# Patient Record
Sex: FEMALE | Race: WHITE
[De-identification: names, ages, dates, MRNs, and addresses within clinical notes are randomized per-mention and may not be internally consistent; named-entity substitution may affect disease eponyms.]

---

## 2017-07-09 ENCOUNTER — HOSPITAL ENCOUNTER (INPATIENT)
Dept: HOSPITAL 1 - ED | Age: 22
LOS: 4 days | Discharge: HOME | DRG: 720 | End: 2017-07-13
Attending: FAMILY MEDICINE | Admitting: FAMILY MEDICINE
Payer: COMMERCIAL

## 2017-07-09 VITALS — DIASTOLIC BLOOD PRESSURE: 42 MMHG | SYSTOLIC BLOOD PRESSURE: 128 MMHG

## 2017-07-09 VITALS — SYSTOLIC BLOOD PRESSURE: 100 MMHG | DIASTOLIC BLOOD PRESSURE: 70 MMHG

## 2017-07-09 VITALS
HEIGHT: 64 IN | WEIGHT: 293 LBS | HEIGHT: 64 IN | BODY MASS INDEX: 50.02 KG/M2 | BODY MASS INDEX: 50.02 KG/M2 | WEIGHT: 293 LBS

## 2017-07-09 DIAGNOSIS — D50.9: ICD-10-CM

## 2017-07-09 DIAGNOSIS — K72.01: ICD-10-CM

## 2017-07-09 DIAGNOSIS — N17.0: ICD-10-CM

## 2017-07-09 DIAGNOSIS — F72: ICD-10-CM

## 2017-07-09 DIAGNOSIS — K72.90: ICD-10-CM

## 2017-07-09 DIAGNOSIS — N39.0: ICD-10-CM

## 2017-07-09 DIAGNOSIS — F84.0: ICD-10-CM

## 2017-07-09 DIAGNOSIS — E66.01: ICD-10-CM
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
## 2017-07-09 DIAGNOSIS — E43: ICD-10-CM

## 2017-07-09 DIAGNOSIS — A41.9: Primary | ICD-10-CM

## 2017-07-09 DIAGNOSIS — Z66: ICD-10-CM

## 2017-07-09 DIAGNOSIS — I89.0: ICD-10-CM

## 2017-07-09 DIAGNOSIS — L03.116: ICD-10-CM

## 2017-07-09 DIAGNOSIS — K90.0: ICD-10-CM

## 2017-07-09 DIAGNOSIS — K29.70: ICD-10-CM

## 2017-07-09 DIAGNOSIS — T14.8: ICD-10-CM

## 2017-07-09 DIAGNOSIS — R65.20: ICD-10-CM

## 2017-07-09 DIAGNOSIS — B96.20: ICD-10-CM

## 2017-07-09 LAB
ALBUMIN SERPL-MCNC: 3.2 G/DL (ref 3.4–5)
ALP SERPL-CCNC: 94 U/L (ref 46–116)
ALT SERPL-CCNC: 15 U/L (ref 14–59)
AST SERPL-CCNC: 14 U/L (ref 15–37)
BASOPHILS NFR BLD: 1.9 % (ref 0–2)
BILIRUB SERPL-MCNC: 1.2 MG/DL (ref 0.2–1)
BUN SERPL-MCNC: 12 MG/DL (ref 7–18)
CALCIUM SERPL-MCNC: 8.3 MG/DL (ref 8.5–10.1)
CHLORIDE SERPL-SCNC: 104 MMOL/L (ref 98–107)
CK MB SERPL-MCNC: 0.6 NG/ML (ref 0–3.6)
CK SERPL-CCNC: 84 U/L (ref 26–192)
CO2 SERPL-SCNC: 26.2 MMOL/L (ref 21–32)
CREAT SERPL-MCNC: 0.8 MG/DL (ref 0.6–1)
ERYTHROCYTE [DISTWIDTH] IN BLOOD BY AUTOMATED COUNT: 19 % (ref 11.5–14.5)
GFR SERPLBLD BASED ON 1.73 SQ M-ARVRAT: > 60 ML/MIN
GLUCOSE SERPL-MCNC: 93 MG/DL (ref 74–106)
IRON SERPL-MCNC: 11 UG/DL (ref 50–170)
MICROSCOPIC UR-IMP: YES
PLATELET # BLD: 197 X10^3MCL (ref 130–400)
POTASSIUM SERPL-SCNC: 4 MMOL/L (ref 3.5–5.1)
PROT SERPL-MCNC: 7 G/DL (ref 6.4–8.2)
RBC # BLD AUTO: 2.99 M/MM3 (ref 4.1–5.1)
RBC # UR STRIP.AUTO: (no result) /UL
RBC MORPH BLD: (no result)
SODIUM SERPL-SCNC: 138 MMOL/L (ref 136–145)
TIBC SERPL-MCNC: 257 UG/DL (ref 250–450)
UA SPECIFIC GRAVITY: 1.01 (ref 1–1.03)

## 2017-07-09 PROCEDURE — A4301 IMPLANTABLE ACCESS SYST PERC: HCPCS

## 2017-07-09 PROCEDURE — P9016 RBC LEUKOCYTES REDUCED: HCPCS

## 2017-07-09 NOTE — NUR
PT RESTING IN BED IN A POSITION OF COMFORT WITH MOTHER AT BEDSIDE, RESP EVEN
AND UNLABORED, PT IN NO ACUTE DISTRESS, MOTHER AT BEDSIDE

## 2017-07-09 NOTE — NUR
PT BROUGHT IN VIA [facility] TO BE EVALUATED FOR THE COMPLAINT OF FEVER
WHICH BEGAN THIS AM AND INCREASED AGITATION PER THE PTS MOTHER.
 
MSE WAS COMPLETED BY DR. EDGAR.
 
PT HAS HX OF SEVERE AUTISM AND IS NON-VERBAL. PT HAS ELEPHANITIS NOTED TO THE
BILATERAL LOWER EXTREMITITES.

## 2017-07-09 NOTE — NUR
RECEIVED PATIENT FROM ED VIA GUERNEY, PATIENT A/O TO NAME MOTHER AT BEDSIDE,
TELE # 3 ST, IV ACCESS TO DIXON WNL, PATIENT NON VERBAL MOTHER AT BEDSIDE STATES
PATIENT IS IN PAIN AND WILL MEDICATE AS ORDERED, ORIENTED PATIENT TO ROOM AND
SURROUNDINGS, BED IN LOW POSITION BED RAILS UP X 2, CALL LIGHT WITHIN REACH,
WILL ENDORSE CARE TO PRIMARY NURSE CATALINA LICONA

## 2017-07-10 VITALS — DIASTOLIC BLOOD PRESSURE: 32 MMHG | SYSTOLIC BLOOD PRESSURE: 122 MMHG

## 2017-07-10 VITALS — SYSTOLIC BLOOD PRESSURE: 131 MMHG | DIASTOLIC BLOOD PRESSURE: 50 MMHG

## 2017-07-10 VITALS — DIASTOLIC BLOOD PRESSURE: 51 MMHG | SYSTOLIC BLOOD PRESSURE: 109 MMHG

## 2017-07-10 VITALS — DIASTOLIC BLOOD PRESSURE: 92 MMHG | SYSTOLIC BLOOD PRESSURE: 135 MMHG

## 2017-07-10 VITALS — SYSTOLIC BLOOD PRESSURE: 123 MMHG | DIASTOLIC BLOOD PRESSURE: 59 MMHG

## 2017-07-10 LAB
AMYLASE SERPL-CCNC: 15 U/L (ref 25–115)
BASOPHILS NFR BLD: 0 % (ref 0–2)
BUN SERPL-MCNC: 13 MG/DL (ref 7–18)
CALCIUM SERPL-MCNC: 7.9 MG/DL (ref 8.5–10.1)
CHLORIDE SERPL-SCNC: 107 MMOL/L (ref 98–107)
CHOLEST SERPL-MCNC: 79 MG/DL (ref ?–200)
CHOLEST/HDLC SERPL: 1.6 MG/DL
CO2 SERPL-SCNC: 21.8 MMOL/L (ref 21–32)
CREAT SERPL-MCNC: 0.9 MG/DL (ref 0.6–1)
ERYTHROCYTE [DISTWIDTH] IN BLOOD BY AUTOMATED COUNT: 19.8 % (ref 11.5–14.5)
GFR SERPLBLD BASED ON 1.73 SQ M-ARVRAT: > 60 ML/MIN
GLUCOSE SERPL-MCNC: 93 MG/DL (ref 74–106)
HDLC SERPL-MCNC: 50 MG/DL (ref 40–60)
LIPASE SERPL-CCNC: 51 IU/L (ref 73–393)
MAGNESIUM SERPL-MCNC: 1.8 MG/DL (ref 1.8–2.4)
MONOCYTES NFR BLD: 1 % (ref 0–7)
NEUTS BAND NFR BLD: 1 % (ref 0–10)
NEUTS SEG NFR BLD MANUAL: 95 % (ref 37–75)
PHOSPHATE SERPL-MCNC: 3.6 MG/DL (ref 2.5–4.9)
PLAT MORPH BLD: (no result)
PLATELET # BLD: 162 X10^3MCL (ref 130–400)
POTASSIUM SERPL-SCNC: 4.3 MMOL/L (ref 3.5–5.1)
RBC MORPH BLD: (no result)
SODIUM SERPL-SCNC: 140 MMOL/L (ref 136–145)
T3 SERPL-MCNC: 0.8 NG/ML
T3RU NFR SERPL: 37 % UPTAKE (ref 30–39)
T4 FREE SERPL-MCNC: 1.76 NG/DL (ref 0.76–1.46)
T4 SERPL-MCNC: 9.7 UG/DL (ref 4.7–13.3)
T4/T3 UPTAKE INDEX SERPL: 3.6 UG/DL (ref 1.4–4.5)
TRIGL SERPL-MCNC: 56 MG/DL (ref ?–150)

## 2017-07-10 NOTE — NUR
RECEIVED Pt A/O X1 Pt IS APHASIC UNABLE TO ANSWER ANY QUESTIONS. NO DISTRESS
NOTED AT THIS TIME. LUNG SOUNDS ARE CTA BILATERALLY. Pt IS ON ROOM AIR, NO SOB
NOTED. ACTIVE BOWEL SOUNDS X4 QUADS. Pt WITH EDEMA TO BLE, REDNESS, WARM TO
TOUCH AND TENDERNESS TO EXTREMITIES MAINLY TO LEFT UPPER THIGH. SOME DRAINNAGE
NOTED WITH FOUL SMELL. MOTHER IS AT BEDSIDE. SAFETY AND COMFORT MEASURES
REMAIN IN PLACE. WILL CONTINUE TO MONITOR.

## 2017-07-10 NOTE — NUR
PT AWAKE ALERT, PT IS ASPHASIC. TELE 3 SR. PT IS INCONTINENT OF BOWEL AND
BLADDER. LUNGS DIMINISHED TO BLL. GENERALIZED WEAKNESS. BOWEL TONES ACTIVE IN
ALL 4 QUADS. BLE CELLULITIS. NO SIGNS OF PAIN. IV TO THE DIXON PATENT AND
INTACT. PT IS CALM AND COOEPRATIVE WITH CARE. CALL LIGHT IN REACH.

## 2017-07-10 NOTE — NUR
PT RESTING IN BED MOTHER AT BEDSIDE. NO SIGNS OF DISTRESS. NO SIGNS OF NON
VERBAL INDICATORS OF PAIN. WILL CONTINUE TO MONITOR.

## 2017-07-10 NOTE — NUR
PATIENT WITH ELEVATED TEMPERATURE.  PRN TYLENOL PROVIDED AND TOLERATED WELL.
COOLING MEASURES IN PLACE.  WILL CONTINUE MONITORING.

## 2017-07-10 NOTE — NUR
TEMPERATIVE RECHECKED AND WNL. PT SKIN WARM TO TOUCH. PT NONVERBAL UNABLE TO
ASSESS NEURO STATUS. MOTHER REPORTS PT IS AT PTS BASELINE. AIR CONDITIONER ON
WILL CONTINUE TO MONITOR.

## 2017-07-10 NOTE — NUR
PT RESTING IN BED. NO DISTRESS NOTED. IVF INFUSING WELL. CALL LIGHT WITHIN
REACH. WILL CONTINUE TO MONITOR.

## 2017-07-10 NOTE — NUR
Pt MEDIACTED WITH TORADOL IV FOR TEMP .0
A/C IS ON, Pt REFUSES TO KEEP COLD PACKS. WILL CONTINUE TO MONITOR.

## 2017-07-11 VITALS — SYSTOLIC BLOOD PRESSURE: 128 MMHG | DIASTOLIC BLOOD PRESSURE: 43 MMHG

## 2017-07-11 VITALS — DIASTOLIC BLOOD PRESSURE: 66 MMHG | SYSTOLIC BLOOD PRESSURE: 127 MMHG

## 2017-07-11 VITALS — SYSTOLIC BLOOD PRESSURE: 128 MMHG | DIASTOLIC BLOOD PRESSURE: 67 MMHG

## 2017-07-11 VITALS — DIASTOLIC BLOOD PRESSURE: 47 MMHG | SYSTOLIC BLOOD PRESSURE: 98 MMHG

## 2017-07-11 VITALS — DIASTOLIC BLOOD PRESSURE: 63 MMHG | SYSTOLIC BLOOD PRESSURE: 100 MMHG

## 2017-07-11 VITALS — DIASTOLIC BLOOD PRESSURE: 54 MMHG | SYSTOLIC BLOOD PRESSURE: 112 MMHG

## 2017-07-11 LAB
BASOPHILS NFR BLD: 0 % (ref 0–2)
BASOPHILS NFR BLD: 0.2 % (ref 0–2)
BUN SERPL-MCNC: 19 MG/DL (ref 7–18)
CALCIUM SERPL-MCNC: 7.9 MG/DL (ref 8.5–10.1)
CHLORIDE SERPL-SCNC: 108 MMOL/L (ref 98–107)
CO2 SERPL-SCNC: 24.8 MMOL/L (ref 21–32)
CREAT SERPL-MCNC: 1 MG/DL (ref 0.6–1)
ERYTHROCYTE [DISTWIDTH] IN BLOOD BY AUTOMATED COUNT: 19 % (ref 11.5–14.5)
ERYTHROCYTE [DISTWIDTH] IN BLOOD BY AUTOMATED COUNT: 19.3 % (ref 11.5–14.5)
GFR SERPLBLD BASED ON 1.73 SQ M-ARVRAT: > 60 ML/MIN
GLUCOSE SERPL-MCNC: 92 MG/DL (ref 74–106)
MAGNESIUM SERPL-MCNC: 1.9 MG/DL (ref 1.8–2.4)
PHOSPHATE SERPL-MCNC: 3.9 MG/DL (ref 2.5–4.9)
PLATELET # BLD: 140 X10^3MCL (ref 130–400)
PLATELET # BLD: 167 X10^3MCL (ref 130–400)
POTASSIUM SERPL-SCNC: 4.3 MMOL/L (ref 3.5–5.1)
RBC MORPH BLD: (no result)
RBC MORPH BLD: (no result)
SODIUM SERPL-SCNC: 141 MMOL/L (ref 136–145)
TARGETS BLD QL SMEAR: (no result)
TRANSFERRIN SERPL-MCNC: 227 MG/DL (ref 200–370)

## 2017-07-11 PROCEDURE — 0DB68ZX EXCISION OF STOMACH, VIA NATURAL OR ARTIFICIAL OPENING ENDOSCOPIC, DIAGNOSTIC: ICD-10-PCS | Performed by: INTERNAL MEDICINE

## 2017-07-11 PROCEDURE — 0DB98ZX EXCISION OF DUODENUM, VIA NATURAL OR ARTIFICIAL OPENING ENDOSCOPIC, DIAGNOSTIC: ICD-10-PCS | Performed by: INTERNAL MEDICINE

## 2017-07-11 PROCEDURE — 30233N1 TRANSFUSION OF NONAUTOLOGOUS RED BLOOD CELLS INTO PERIPHERAL VEIN, PERCUTANEOUS APPROACH: ICD-10-PCS | Performed by: FAMILY MEDICINE

## 2017-07-11 NOTE — NUR
PT AWAKE ALERT, SPHASIC. TELE 2. MOTHER STATES PT IS AT NORMAL BASELINE. DUE
TO DEFICITS UNABLE TO ASSESS NEURO STATUS COMPLETLY. PT TRACKS WITH EYES,
FLEXES TO PAIN, AND MOANS AND GRUNTS WHEN IN PAIN OR NEEDING ASSISTANCE. LUNG
SOUNDS DIMINISHED TO BLL, SHALLOW EVEN REPIRATIONS. BOWEL TONES ACTIVE IN ALL
QUADRANTS. PT IS INCONTINENT OF BOWEL AND BLADDER. BED REST AT THIS TIME. SKIN
IS INTACT, WITH CELLULITIS AND DISCOLORATION TO BLE. IV TO THE SHLOMO/RH
INFUSING, PT CURRENTLY RECIEVING BLOOD TRANSFUSION. FAMILY AT BEDSIDE. WILL
CONTINUE TO MONITOR CALL LIGHT IN REACH.

## 2017-07-11 NOTE — NUR
I HAVE REVIEWED THE DATA COLLECTION BY LVN (NAME): JOSELITO TOBAR LVN
ENTERED ON (DATE/TIME): 7P-7A
 
I CONCUR WITH THE DATA AND ANY EXCEPTIONS OR COMMENTS ARE LISTED BELOW:

## 2017-07-11 NOTE — NUR
PT RESTING IN BED NO SIGNS OF DISTRESS, EVEN UNLABORED RESPIRATIONS VSS. CALL
LIGHT IN REACH, FAMILY AT BEDSIDE WILL CONTINUE TO MONITOR.

## 2017-07-11 NOTE — NUR
PATIENT RECEIVED AWAKE AND ALERT. MOTHER AT BEDSIDE.  NO DISTRESS NOTED. NO
SIGNS AND SYMPTOMS OF PAIN NOTED. IV SITE TO SHLOMO, PATENT AND INTACT. IV SITE
TO RIGHT FOREARM, PATENT AND INTACT. BED IN LOWEST POSITION. CALL LIGHT WITHIN
REACH. WILL CONTINUE TO MONITOR.

## 2017-07-11 NOTE — NUR
PT RESTING IN BED NO SIGNS OF DISTRESS CALL LIGHT IN REACH, FAMILY AT BEDSIDE.
WILL CONTINUE TO MONITOR.

## 2017-07-11 NOTE — NUR
BLOOD TRANSFUSION COMPLETED POST VS WNL, NO TRANSFUSION REACTION. 250 ML
INFUSED, REMAINING BLOOD DISPOSED.

## 2017-07-12 VITALS — SYSTOLIC BLOOD PRESSURE: 141 MMHG | DIASTOLIC BLOOD PRESSURE: 75 MMHG

## 2017-07-12 VITALS — DIASTOLIC BLOOD PRESSURE: 75 MMHG | SYSTOLIC BLOOD PRESSURE: 153 MMHG

## 2017-07-12 VITALS — DIASTOLIC BLOOD PRESSURE: 66 MMHG | SYSTOLIC BLOOD PRESSURE: 141 MMHG

## 2017-07-12 VITALS — SYSTOLIC BLOOD PRESSURE: 130 MMHG | DIASTOLIC BLOOD PRESSURE: 73 MMHG

## 2017-07-12 VITALS — SYSTOLIC BLOOD PRESSURE: 154 MMHG | DIASTOLIC BLOOD PRESSURE: 72 MMHG

## 2017-07-12 LAB
ALBUMIN SERPL-MCNC: 2.1 G/DL (ref 3.4–5)
BASOPHILS NFR BLD: 0 % (ref 0–2)
BASOPHILS NFR BLD: 0 % (ref 0–2)
BUN SERPL-MCNC: 19 MG/DL (ref 7–18)
CALCIUM SERPL-MCNC: 7.8 MG/DL (ref 8.5–10.1)
CHLORIDE SERPL-SCNC: 114 MMOL/L (ref 98–107)
CO2 SERPL-SCNC: 24.5 MMOL/L (ref 21–32)
CREAT SERPL-MCNC: 0.9 MG/DL (ref 0.6–1)
ERYTHROCYTE [DISTWIDTH] IN BLOOD BY AUTOMATED COUNT: 19.5 % (ref 11.5–14.5)
ERYTHROCYTE [DISTWIDTH] IN BLOOD BY AUTOMATED COUNT: 20.2 % (ref 11.5–14.5)
GFR SERPLBLD BASED ON 1.73 SQ M-ARVRAT: > 60 ML/MIN
GLUCOSE SERPL-MCNC: 82 MG/DL (ref 74–106)
MAGNESIUM SERPL-MCNC: 1.9 MG/DL (ref 1.8–2.4)
PHOSPHATE SERPL-MCNC: 3.8 MG/DL (ref 2.5–4.9)
PLATELET # BLD: 167 X10^3MCL (ref 130–400)
PLATELET # BLD: 168 X10^3MCL (ref 130–400)
POTASSIUM SERPL-SCNC: 4.5 MMOL/L (ref 3.5–5.1)
RBC MORPH BLD: (no result)
SODIUM SERPL-SCNC: 147 MMOL/L (ref 136–145)
TARGETS BLD QL SMEAR: (no result)

## 2017-07-12 PROCEDURE — 05HM33Z INSERTION OF INFUSION DEVICE INTO RIGHT INTERNAL JUGULAR VEIN, PERCUTANEOUS APPROACH: ICD-10-PCS | Performed by: FAMILY MEDICINE

## 2017-07-12 PROCEDURE — B543ZZA ULTRASONOGRAPHY OF RIGHT JUGULAR VEINS, GUIDANCE: ICD-10-PCS

## 2017-07-12 NOTE — NUR
PATIENT RESTED THROUGHOUT THE NIGHT. NO DISTRESS NOTED. NO SIGNS AND SYMPTOMS
OF PAIN NOTED. SAFETY AND COMFORT MEASURES MAINTAINED. BED IN LOWEST POSITION.
CALL LIGHT WITHIN REACH. WILL CONTINUE TO MONITOR AND ENDORSE TO NEXT SHIFT
NURSE.

## 2017-07-12 NOTE — NUR
3 PORTS OF CENTRAL LINE FLUSHED WELL PER PROTOCOL. THE IV WITH 1/2 NS RUNNING
VIA THE BLUE PORT OF THE CENTRAL LINE.

## 2017-07-12 NOTE — NUR
RECEIVED THE PATIENT BACK FROM RECOVERY ROOM S/P CENTRAL LINE INSERTION TO
[facility].
THE PATIENT WAS SLIGHTLY DROWSY BUT FOLLOW SIMPLE COMMANDS.
VS CHECKED (SEE DOC).
CALL LIGHT WITHIN REACH.
SIDE RAILS UP X3.
BED WAS AT LOWEST POSITION AND ALARM WAS ON.
THE MOTHER WAS AT BEDSIDE.
CONTINUE TO MONITOR.

## 2017-07-12 NOTE — NUR
THE BLOOD TRANSFUSION COMPLETED. VS CHECKED: TEMP 99.3, /72, RR 16, HR
100, PO2 99 % ON ROOM AIR. NO ADVERSE REACTION NOTED.

## 2017-07-12 NOTE — NUR
AT 1515, ONE UNIT OF PRBC STARTED TO BE INFUSED. THE VS CHECKED TEMP 98.8, HR
102, /73, RR 16, AND PO2 100% ON NASAL CANNULA AT 2L/MIN.
INSTRUCTED THE MOTHER TO NOTIFY THE NURSE FOR ANY ADVERSE REACTION.
AT 1530, RECHECKING THE VS (15MIN BLOOD STARTED TO INFUSED): TEMP 99.3
(AXILLARY) AND 99.9 (TEMPORAL),  /73, RR 16 AND PO2 99% ON NASAL
CANNULA AT 2L/MIN.
DR. NAM WAS PAGED TO NOTIFY THE INCREASING TEMP.

## 2017-07-12 NOTE — NUR
Awake and responsive but no words spoken noted. No resp.distress noted. No
cues of pain. Mother at the bedside. All due meds taken and tolerated well.
Will cont.to monitor. Call light within reach.

## 2017-07-12 NOTE — NUR
DR. NAM AND THE TEAM WERE MAKING ROUND TO SEE THE PATIENT. THE CARE PLAN
WAS DISCUSSED WITH THE PATIENT'S MOTHER. DR. NAM WAS MADE AWARE OF THE
PATIENT'S LAB RESULTS THIS MORNING INCLUDING H/H 5.7/19.

## 2017-07-12 NOTE — NUR
DR. NELSON CALLED BACK AND WAS AWARE OF THE TEMP 99.3 (AXILLARY) AND 99.9
(TEMPORAL). DOCTOR VERBALIZED MEDICATING THE PATIENT WITH TYLENOL 650 MG PO.
THE MED WAS GIVEN TO THE PATIENT.
INSTRUCTED THE MOTHER TO CALL THE NURSE FOR ANY ADVERSE REACTION.
CONTINUE TO MONITOR.

## 2017-07-12 NOTE — NUR
RECEIVED THE PATIENT AWAKE BUT NON-VERBAL AT THIS TIME WITH HX OF AUTISM.
NO INDICATION OF PAIN, SHORTNESS OF BREATH OR NAUSEA/VOMITING.
TELE # 2 READS SINUS TACHYCARDIA.
IVF NS VIA H/L TO RFA.
CALL LIGHT WITHIN REACH.
SIDE RAILS UP X3.
THE MOTHER WAS AT BEDSIDE.

## 2017-07-13 VITALS — DIASTOLIC BLOOD PRESSURE: 76 MMHG | SYSTOLIC BLOOD PRESSURE: 145 MMHG

## 2017-07-13 VITALS — DIASTOLIC BLOOD PRESSURE: 73 MMHG | SYSTOLIC BLOOD PRESSURE: 141 MMHG

## 2017-07-13 VITALS — SYSTOLIC BLOOD PRESSURE: 141 MMHG | DIASTOLIC BLOOD PRESSURE: 73 MMHG

## 2017-07-13 NOTE — NUR
Afebrile. No significant change in condition noted. No active bleeding
observed. Mother at the bedside. No cues of pain. Cont.on IV cleocin and oral
bactrim. Contact isolation E.coli, ESBL urine, proper use of PPE and good hand
hygiene observed. Kept comfortable.

## 2017-07-13 NOTE — NUR
DR. NAM AND THE TEAM WERE MAKING ROUND TO SEE THE PATIENT. THE CARE PLAN
WAS UPDATED TO THE MOTHER AT BEDSIDE. THE MOTHER AGREED WITH THE PLAN.

## 2017-07-13 NOTE — NUR
DISCHARGE INSTRUCTION WAS EXPLAINED AND HANDED TO THE PATIENT'S MOTHER, STARR
ANTONIO.
ALL CONCERNS WERE ADDRESSED AND THE MOTHER VERBALIZED UNDERSTANDING.
H/L AT RFA AND CENTRAL LINE AT RIGHT NECK WERE REMOVED WITH THE TIPS INTACT.
THE TELE WAS REMOVED AND RETURNED.
ID BANDS WERE REMOVED.
THE PATIENT WAS TAKEN TO THE LOBBY VIA HER OWN WHEELCHAIR IN STABLE CONDITION.
ALL HER BELONGINGS WERE SENT HOME WITH THE PATIENT UPON DISCHARGE.

## 2017-07-13 NOTE — NUR
DR. ART-RESIDENT WAS CALLED AND NOTIFIED THAT THE PATIENT'S WOUND CULTURE
SHOWS MDRO. DOCTOR WILL CHECK WITH SENIOR.

## 2017-07-13 NOTE — NUR
RESUME CARE: PATIENT AWAKE BUT NON VERBAL. NO INDICATION OF PAIN,
NAUSEA/VOMITING OR SHORTNESS OF BREATH.
CENTRAL LINE TO RIGHT NECK WITH DRESSING IN PLACE.
ANOTHER H/L TO RFA.
TELE # 2 READS SINUS RHYTHMS WITH EPISODES OF SINUS TACHYCARDIA.
CALL LIGHT WITHIN REACH.
SIDE RAILS UP X3.
BED WAS AT LOWEST POSITION AND ALARM WAS ON.
THE MOTHER WAS AT BEDSIDE.

## 2017-08-23 ENCOUNTER — HOSPITAL ENCOUNTER (INPATIENT)
Dept: HOSPITAL 1 - ED | Age: 22
LOS: 1 days | Discharge: HOME | DRG: 383 | End: 2017-08-24
Attending: FAMILY MEDICINE | Admitting: FAMILY MEDICINE
Payer: COMMERCIAL

## 2017-08-23 VITALS — SYSTOLIC BLOOD PRESSURE: 145 MMHG | DIASTOLIC BLOOD PRESSURE: 97 MMHG

## 2017-08-23 VITALS — WEIGHT: 293 LBS | BODY MASS INDEX: 50.02 KG/M2 | HEIGHT: 64 IN

## 2017-08-23 VITALS — DIASTOLIC BLOOD PRESSURE: 92 MMHG | SYSTOLIC BLOOD PRESSURE: 153 MMHG

## 2017-08-23 DIAGNOSIS — L97.929: ICD-10-CM

## 2017-08-23 DIAGNOSIS — F84.0: ICD-10-CM

## 2017-08-23 DIAGNOSIS — Z94.89: ICD-10-CM

## 2017-08-23 DIAGNOSIS — B87.1: ICD-10-CM

## 2017-08-23 DIAGNOSIS — E66.01: ICD-10-CM

## 2017-08-23 DIAGNOSIS — N17.0: ICD-10-CM

## 2017-08-23 DIAGNOSIS — Z66: ICD-10-CM

## 2017-08-23 DIAGNOSIS — I89.0: ICD-10-CM

## 2017-08-23 DIAGNOSIS — E44.0: ICD-10-CM

## 2017-08-23 DIAGNOSIS — D50.9: ICD-10-CM

## 2017-08-23 DIAGNOSIS — E03.9: ICD-10-CM

## 2017-08-23 DIAGNOSIS — L03.116: Primary | ICD-10-CM

## 2017-08-23 LAB
ALBUMIN SERPL-MCNC: 3.1 G/DL (ref 3.4–5)
ALP SERPL-CCNC: 103 U/L (ref 46–116)
ALT SERPL-CCNC: 35 U/L (ref 14–59)
AMYLASE SERPL-CCNC: 21 U/L (ref 25–115)
AST SERPL-CCNC: 21 U/L (ref 15–37)
BASOPHILS NFR BLD: 0.3 % (ref 0–2)
BILIRUB SERPL-MCNC: 0.33 MG/DL (ref 0.2–1)
BUN SERPL-MCNC: 18 MG/DL (ref 7–18)
CALCIUM SERPL-MCNC: 8.8 MG/DL (ref 8.5–10.1)
CHLORIDE SERPL-SCNC: 107 MMOL/L (ref 98–107)
CHOLEST SERPL-MCNC: 155 MG/DL (ref ?–200)
CHOLEST/HDLC SERPL: 3.8 MG/DL
CK MB SERPL-MCNC: 0.6 NG/ML (ref 0–3.6)
CK SERPL-CCNC: 36 U/L (ref 26–192)
CO2 SERPL-SCNC: 27 MMOL/L (ref 21–32)
CREAT SERPL-MCNC: 0.6 MG/DL (ref 0.6–1)
CRP SERPL-MCNC: 3.7 MG/DL (ref ?–0.9)
ERYTHROCYTE [DISTWIDTH] IN BLOOD BY AUTOMATED COUNT: 22.4 % (ref 11.5–14.5)
GFR SERPLBLD BASED ON 1.73 SQ M-ARVRAT: > 60 ML/MIN
GLUCOSE SERPL-MCNC: 91 MG/DL (ref 74–106)
HDLC SERPL-MCNC: 41 MG/DL (ref 40–60)
LIPASE SERPL-CCNC: 92 IU/L (ref 73–393)
MAGNESIUM SERPL-MCNC: 1.9 MG/DL (ref 1.8–2.4)
PHOSPHATE SERPL-MCNC: 3.9 MG/DL (ref 2.5–4.9)
PLATELET # BLD: 308 X10^3MCL (ref 130–400)
POTASSIUM SERPL-SCNC: 4.3 MMOL/L (ref 3.5–5.1)
PROT SERPL-MCNC: 7.9 G/DL (ref 6.4–8.2)
SODIUM SERPL-SCNC: 142 MMOL/L (ref 136–145)
T3 SERPL-MCNC: 1.69 NG/ML
T3RU NFR SERPL: 36 % UPTAKE (ref 30–39)
T4 FREE SERPL-MCNC: 1.62 NG/DL (ref 0.76–1.46)
T4 SERPL-MCNC: 12.4 UG/DL (ref 4.7–13.3)
T4/T3 UPTAKE INDEX SERPL: 4.5 UG/DL (ref 1.4–4.5)
TRIGL SERPL-MCNC: 109 MG/DL (ref ?–150)

## 2017-08-23 NOTE — NUR
PT WAS RESTLESS AND AGITATED, MEDICATED WITH NORCO AND MORPHINE X1. PT WAS
ABLE TO SLEEP AND NO DISTRESS NOTED AFTERWARDS. SAFETY MEASURES MAINTAINED.
WILL ENDORSE CONTINUITY OF CARE TO ONCOMING RN. MOTHER AT BEDSIDE AND
SUPPORTIVE OF CARE.

## 2017-08-23 NOTE — NUR
DR. GONZALEZ CAME TO SAW PATIENT. WOUND CARE TO L FOOT BY DR. GONZALEZ. PHOTO
TAKEN. WOUND CULTURE CELLECTED.

## 2017-08-23 NOTE — NUR
RECEIVED PATIENT FROM ED VIA GUERNEY, PATIENT A/O X 1 HX OF AUTISM MOTHER AT
BEDSIDE, TELE # 60 ST, IV ACCESS TO RIGHT HAND WNL, NO C/O OR S/S OF PAIN AT
THIS TIME, ORIENTED PATIENT/FAMILY TO ROOM AND SURROUNDINGS, BED IN LOW
POSITION, BED RAILS UP X 2, CALL LIGHT WITHIN REACH, WILL ENDORSE CARE TO
PRIMARY NURSE KIN RM

## 2017-08-23 NOTE — NUR
IV TO R HAND CATHETER OUT. NEW IV INSERTED TO LFA W/ 20G NEELDE. PATIENT
REFUSED FOOD OUTSIDE HOME. BUT PATIENT ABLE TO DRINK WATER.

## 2017-08-23 NOTE — NUR
ANOTHER  AT BEDSIDE TO ATTEMPT FOR BLOOD CULTURES AT THIS TIME, WILL
ADMINISTER ORDERED ABX AFTER SECOND COLLECTION OF BLOOD CULTURES

## 2017-08-23 NOTE — NUR
PT BIB [facility] WITH MOTHER AT BEDSIDE FOR MAGGOTS FOUND ON TOP OF LEFT
FOOT, MOTHER STS SHE WAS DRESSING PT AND NOTICED THE MAGGOTS THIS MORNING, PER
MEDIC PT HOUSE APPEARS CLEAN, PT HAS HX OF LYMPHEDEMA SINCE 2009, PT HAS SEVERE
SWELLING AND DRY SKIN TO SEVERINO LOWER EXT, PINK TINY BUMPS NOTED TO TOP OF LEFT
FOOT WHICH APPEAR MOIST WITH MAGGOTS NOTED INSIDE CREVICES, PT HX OF AUTISM
SINCE BIRTH AND NONVERBAL, PER MOTHER NO SIGNIFICANT CHANGES IN MENTATIONS, PT
AWAKE ALERT, NO GUARDING NOTED, PT RESP EVEN AND UNLABORED, IN NO ACUTE
DISTRESS, RESTING COMFORTABLY IN BED WITH MOTHER AT BEDSIDE, PENDING MSE, CALL
LIGHT WITHIN MOTHERS REACH, WILL CONTINUE TO MONITOR

## 2017-08-23 NOTE — NUR
RECEIVED REPORT FROM DAY SHIFT RN. PT RESTING IN BED. AWAKE AND ALERT,
NONVERBAL. NO FACIAL GRIMACE. HX OF AUTISM. IV ON LFA, NS INFUSING. BED IN
LOWEST POSITION. SIDE RAILS UP X2. MOTHER AT BEDSIDE.

## 2017-08-24 VITALS — SYSTOLIC BLOOD PRESSURE: 118 MMHG | DIASTOLIC BLOOD PRESSURE: 61 MMHG

## 2017-08-24 VITALS — DIASTOLIC BLOOD PRESSURE: 75 MMHG | SYSTOLIC BLOOD PRESSURE: 138 MMHG

## 2017-08-24 VITALS — DIASTOLIC BLOOD PRESSURE: 61 MMHG | SYSTOLIC BLOOD PRESSURE: 118 MMHG

## 2017-08-24 LAB
BASOPHILS NFR BLD: 0.2 % (ref 0–2)
BUN SERPL-MCNC: 17 MG/DL (ref 7–18)
CALCIUM SERPL-MCNC: 8.3 MG/DL (ref 8.5–10.1)
CHLORIDE SERPL-SCNC: 108 MMOL/L (ref 98–107)
CO2 SERPL-SCNC: 26.3 MMOL/L (ref 21–32)
CREAT SERPL-MCNC: 0.6 MG/DL (ref 0.6–1)
ERYTHROCYTE [DISTWIDTH] IN BLOOD BY AUTOMATED COUNT: 22.8 % (ref 11.5–14.5)
ERYTHROCYTE [SEDIMENTATION RATE] IN BLOOD BY WESTERGREN METHOD: 34 MM/HR (ref 0–20)
GFR SERPLBLD BASED ON 1.73 SQ M-ARVRAT: > 60 ML/MIN
GLUCOSE SERPL-MCNC: 95 MG/DL (ref 74–106)
MAGNESIUM SERPL-MCNC: 1.9 MG/DL (ref 1.8–2.4)
PHOSPHATE SERPL-MCNC: 4.3 MG/DL (ref 2.5–4.9)
PLATELET # BLD: 291 X10^3MCL (ref 130–400)
POTASSIUM SERPL-SCNC: 4.2 MMOL/L (ref 3.5–5.1)
RBC MORPH BLD: (no result)
SODIUM SERPL-SCNC: 141 MMOL/L (ref 136–145)

## 2017-08-24 NOTE — NUR
PATIENT HAS AUTISM. NONVERBRAL. MOTHER AT BED SIDE. TELE#60 = ST; HR = 107. NO
RESP DISTRESS, O2 SAT 96% ON RA. NO S/S OF PAIN NOW. PATIENT REFUSED EAT
OUTSIDE OF HOME. IVF OF NS 100CC/HR INFUSING WELL. DRSG TO LT FOOT APPLIED BY
PODIATRIST. DRSG INTACT. S/S INCONT. BLE SWELLING 4+. BED RESTING. CALL LIGHT
IN REACH.

## 2017-08-24 NOTE — NUR
D/C TO HOME PER ORDER. INSTRUCTION GIVEN TO MOTHER, MS. RUPAL ALVAREZ. IV
D/C'D. OVER NEEDLE CATHETER INTACT. IV SITE CLEAN. NO REDNESS/SWELLING. PER
MOTHER - DRSG TO L FOOT WOULD BE CHANGED TOMORROW BY HOME HEALTH CARE
NURSE. CONDITION STABLE.

## 2017-08-24 NOTE — NUR
DR. ALEX AND MEDICAL TEAM MADE MORNING ROUND. PLAN OF CARE DISCUSSED WITH
PATIENT'S MOTHER, INCLUDED WITH PATIANT NEEDS TO TRANSFER TO [facility] FOR LYNPHEDEMA BLE. PATIENT'S MOTHER AGREED WITH PLAN OF CARE. SHE
WANTED PATIENT GO HOME TODAY.

## 2017-09-22 ENCOUNTER — HOSPITAL ENCOUNTER (INPATIENT)
Dept: HOSPITAL 1 - ED | Age: 22
LOS: 3 days | Discharge: HOME | DRG: 720 | End: 2017-09-25
Attending: FAMILY MEDICINE | Admitting: FAMILY MEDICINE
Payer: COMMERCIAL

## 2017-09-22 VITALS — DIASTOLIC BLOOD PRESSURE: 52 MMHG | SYSTOLIC BLOOD PRESSURE: 124 MMHG

## 2017-09-22 VITALS
WEIGHT: 293 LBS | HEIGHT: 64 IN | BODY MASS INDEX: 50.02 KG/M2 | BODY MASS INDEX: 50.02 KG/M2 | WEIGHT: 293 LBS | HEIGHT: 64 IN

## 2017-09-22 VITALS — SYSTOLIC BLOOD PRESSURE: 130 MMHG | DIASTOLIC BLOOD PRESSURE: 69 MMHG

## 2017-09-22 DIAGNOSIS — Z94.89: ICD-10-CM

## 2017-09-22 DIAGNOSIS — E43: ICD-10-CM

## 2017-09-22 DIAGNOSIS — L03.311: ICD-10-CM

## 2017-09-22 DIAGNOSIS — F84.0: ICD-10-CM

## 2017-09-22 DIAGNOSIS — R32: ICD-10-CM

## 2017-09-22 DIAGNOSIS — A41.9: Primary | ICD-10-CM

## 2017-09-22 DIAGNOSIS — R15.9: ICD-10-CM

## 2017-09-22 DIAGNOSIS — N17.0: ICD-10-CM

## 2017-09-22 DIAGNOSIS — E83.42: ICD-10-CM

## 2017-09-22 DIAGNOSIS — I89.0: ICD-10-CM

## 2017-09-22 DIAGNOSIS — E66.01: ICD-10-CM

## 2017-09-22 DIAGNOSIS — D50.9: ICD-10-CM

## 2017-09-22 LAB
ALBUMIN SERPL-MCNC: 2.7 G/DL (ref 3.4–5)
ALP SERPL-CCNC: 146 U/L (ref 46–116)
ALT SERPL-CCNC: 41 U/L (ref 14–59)
AST SERPL-CCNC: 36 U/L (ref 15–37)
BILIRUB SERPL-MCNC: 1 MG/DL (ref 0.2–1)
BUN SERPL-MCNC: 18 MG/DL (ref 7–18)
CALCIUM SERPL-MCNC: 8.4 MG/DL (ref 8.5–10.1)
CHLORIDE SERPL-SCNC: 103 MMOL/L (ref 98–107)
CHOLEST SERPL-MCNC: 152 MG/DL (ref ?–200)
CHOLEST/HDLC SERPL: 3.4 MG/DL
CO2 SERPL-SCNC: 26.5 MMOL/L (ref 21–32)
CREAT SERPL-MCNC: 0.8 MG/DL (ref 0.6–1)
ERYTHROCYTE [DISTWIDTH] IN BLOOD BY AUTOMATED COUNT: 19.5 % (ref 11.5–14.5)
GFR SERPLBLD BASED ON 1.73 SQ M-ARVRAT: > 60 ML/MIN
GLUCOSE SERPL-MCNC: 98 MG/DL (ref 74–106)
HDLC SERPL-MCNC: 45 MG/DL (ref 40–60)
MAGNESIUM SERPL-MCNC: 1.7 MG/DL (ref 1.8–2.4)
MONOCYTES NFR BLD: 2 % (ref 0–7)
NEUTS BAND NFR BLD: 11 % (ref 0–10)
NEUTS SEG NFR BLD MANUAL: 80 % (ref 37–75)
PHOSPHATE SERPL-MCNC: 3.5 MG/DL (ref 2.5–4.9)
PLATELET # BLD: 234 X10^3MCL (ref 130–400)
POTASSIUM SERPL-SCNC: 4.1 MMOL/L (ref 3.5–5.1)
PROT SERPL-MCNC: 7.8 G/DL (ref 6.4–8.2)
RBC MORPH BLD: (no result)
SODIUM SERPL-SCNC: 138 MMOL/L (ref 136–145)
T3 SERPL-MCNC: 0.65 NG/ML
T3RU NFR SERPL: 38 % UPTAKE (ref 30–39)
T4 FREE SERPL-MCNC: 1.43 NG/DL (ref 0.76–1.46)
T4 SERPL-MCNC: 9.2 UG/DL (ref 4.7–13.3)
T4/T3 UPTAKE INDEX SERPL: 3.5 UG/DL (ref 1.4–4.5)
TRIGL SERPL-MCNC: 106 MG/DL (ref ?–150)

## 2017-09-23 VITALS — DIASTOLIC BLOOD PRESSURE: 58 MMHG | SYSTOLIC BLOOD PRESSURE: 163 MMHG

## 2017-09-23 VITALS — DIASTOLIC BLOOD PRESSURE: 68 MMHG | SYSTOLIC BLOOD PRESSURE: 146 MMHG

## 2017-09-23 VITALS — SYSTOLIC BLOOD PRESSURE: 123 MMHG | DIASTOLIC BLOOD PRESSURE: 78 MMHG

## 2017-09-23 VITALS — SYSTOLIC BLOOD PRESSURE: 125 MMHG | DIASTOLIC BLOOD PRESSURE: 56 MMHG

## 2017-09-23 VITALS — SYSTOLIC BLOOD PRESSURE: 111 MMHG | DIASTOLIC BLOOD PRESSURE: 70 MMHG

## 2017-09-23 LAB
BASOPHILS NFR BLD: 0 % (ref 0–2)
BUN SERPL-MCNC: 19 MG/DL (ref 7–18)
CALCIUM SERPL-MCNC: 7.7 MG/DL (ref 8.5–10.1)
CHLORIDE SERPL-SCNC: 106 MMOL/L (ref 98–107)
CO2 SERPL-SCNC: 25.2 MMOL/L (ref 21–32)
CREAT SERPL-MCNC: 0.8 MG/DL (ref 0.6–1)
ERYTHROCYTE [DISTWIDTH] IN BLOOD BY AUTOMATED COUNT: 19.2 % (ref 11.5–14.5)
GFR SERPLBLD BASED ON 1.73 SQ M-ARVRAT: > 60 ML/MIN
GLUCOSE SERPL-MCNC: 95 MG/DL (ref 74–106)
IRON SERPL-MCNC: 20 UG/DL (ref 50–170)
MAGNESIUM SERPL-MCNC: 1.8 MG/DL (ref 1.8–2.4)
MONOCYTES NFR BLD: 1 % (ref 0–7)
NEUTS BAND NFR BLD: 12 % (ref 0–10)
NEUTS SEG NFR BLD MANUAL: 82 % (ref 37–75)
PHOSPHATE SERPL-MCNC: 3.9 MG/DL (ref 2.5–4.9)
PLAT MORPH BLD: (no result)
PLATELET # BLD: 172 X10^3MCL (ref 130–400)
POTASSIUM SERPL-SCNC: 3.8 MMOL/L (ref 3.5–5.1)
RBC # BLD AUTO: 3.87 M/MM3 (ref 4.1–5.1)
RBC MORPH BLD: (no result)
SODIUM SERPL-SCNC: 137 MMOL/L (ref 136–145)
TIBC SERPL-MCNC: 160 UG/DL (ref 250–450)

## 2017-09-24 VITALS — DIASTOLIC BLOOD PRESSURE: 84 MMHG | SYSTOLIC BLOOD PRESSURE: 113 MMHG

## 2017-09-24 VITALS — DIASTOLIC BLOOD PRESSURE: 51 MMHG | SYSTOLIC BLOOD PRESSURE: 111 MMHG

## 2017-09-24 VITALS — SYSTOLIC BLOOD PRESSURE: 100 MMHG | DIASTOLIC BLOOD PRESSURE: 75 MMHG

## 2017-09-24 VITALS — SYSTOLIC BLOOD PRESSURE: 143 MMHG | DIASTOLIC BLOOD PRESSURE: 84 MMHG

## 2017-09-24 LAB
BASOPHILS NFR BLD: 0 % (ref 0–2)
BUN SERPL-MCNC: 18 MG/DL (ref 7–18)
CALCIUM SERPL-MCNC: 7.7 MG/DL (ref 8.5–10.1)
CHLORIDE SERPL-SCNC: 106 MMOL/L (ref 98–107)
CO2 SERPL-SCNC: 23.6 MMOL/L (ref 21–32)
CREAT SERPL-MCNC: 0.7 MG/DL (ref 0.6–1)
ERYTHROCYTE [DISTWIDTH] IN BLOOD BY AUTOMATED COUNT: 19 % (ref 11.5–14.5)
GFR SERPLBLD BASED ON 1.73 SQ M-ARVRAT: > 60 ML/MIN
GLUCOSE SERPL-MCNC: 84 MG/DL (ref 74–106)
MONOCYTES NFR BLD: 16 % (ref 0–7)
NEUTS BAND NFR BLD: 8 % (ref 0–10)
NEUTS SEG NFR BLD MANUAL: 64 % (ref 37–75)
PLAT MORPH BLD: (no result)
PLATELET # BLD: 176 X10^3MCL (ref 130–400)
POTASSIUM SERPL-SCNC: 3.9 MMOL/L (ref 3.5–5.1)
RBC MORPH BLD: (no result)
SODIUM SERPL-SCNC: 138 MMOL/L (ref 136–145)
TRANSFERRIN SERPL-MCNC: 133 MG/DL (ref 200–370)

## 2017-09-25 VITALS — DIASTOLIC BLOOD PRESSURE: 65 MMHG | SYSTOLIC BLOOD PRESSURE: 131 MMHG

## 2017-09-25 VITALS — SYSTOLIC BLOOD PRESSURE: 131 MMHG | DIASTOLIC BLOOD PRESSURE: 65 MMHG

## 2017-09-25 VITALS — SYSTOLIC BLOOD PRESSURE: 134 MMHG | DIASTOLIC BLOOD PRESSURE: 80 MMHG

## 2017-10-29 ENCOUNTER — HOSPITAL ENCOUNTER (EMERGENCY)
Dept: HOSPITAL 1 - ED | Age: 22
LOS: 1 days | Discharge: HOME | End: 2017-10-30
Payer: COMMERCIAL

## 2017-10-29 DIAGNOSIS — K72.90: Primary | ICD-10-CM

## 2017-10-29 DIAGNOSIS — I89.0: ICD-10-CM

## 2017-10-29 DIAGNOSIS — E03.9: ICD-10-CM

## 2017-10-29 DIAGNOSIS — Z88.1: ICD-10-CM

## 2017-10-29 DIAGNOSIS — E46: ICD-10-CM

## 2017-10-29 DIAGNOSIS — Z88.0: ICD-10-CM

## 2017-10-29 DIAGNOSIS — Z91.012: ICD-10-CM

## 2017-10-29 DIAGNOSIS — F84.0: ICD-10-CM

## 2017-10-29 DIAGNOSIS — R45.1: ICD-10-CM

## 2017-10-29 DIAGNOSIS — Z66: ICD-10-CM

## 2017-10-29 LAB
ALBUMIN SERPL-MCNC: 2.9 G/DL (ref 3.4–5)
ALP SERPL-CCNC: 135 U/L (ref 46–116)
ALT SERPL-CCNC: 55 U/L (ref 14–59)
AMPHETAMINES UR QL SCN: (no result)
AMYLASE SERPL-CCNC: 18 U/L (ref 25–115)
AST SERPL-CCNC: 43 U/L (ref 15–37)
BASOPHILS NFR BLD: 0 % (ref 0–2)
BILIRUB SERPL-MCNC: 0.4 MG/DL (ref 0.2–1)
BUN SERPL-MCNC: 20 MG/DL (ref 7–18)
CALCIUM SERPL-MCNC: 8.2 MG/DL (ref 8.5–10.1)
CHLORIDE SERPL-SCNC: 105 MMOL/L (ref 98–107)
CHOLEST SERPL-MCNC: 119 MG/DL (ref ?–200)
CO2 SERPL-SCNC: 28.4 MMOL/L (ref 21–32)
CREAT SERPL-MCNC: 0.8 MG/DL (ref 0.6–1)
ERYTHROCYTE [DISTWIDTH] IN BLOOD BY AUTOMATED COUNT: 17.4 % (ref 11.5–14.5)
GFR SERPLBLD BASED ON 1.73 SQ M-ARVRAT: > 60 ML/MIN
GLUCOSE SERPL-MCNC: 107 MG/DL (ref 74–106)
HDLC SERPL-MCNC: 35 MG/DL (ref 40–60)
LIPASE SERPL-CCNC: 84 IU/L (ref 73–393)
MAGNESIUM SERPL-MCNC: 1.5 MG/DL (ref 1.8–2.4)
MICROSCOPIC UR-IMP: NO
PLATELET # BLD: 260 X10^3MCL (ref 130–400)
POTASSIUM SERPL-SCNC: 4.1 MMOL/L (ref 3.5–5.1)
PROT SERPL-MCNC: 7.6 G/DL (ref 6.4–8.2)
RBC # UR STRIP.AUTO: NEGATIVE /UL
SODIUM SERPL-SCNC: 140 MMOL/L (ref 136–145)
T4 SERPL-MCNC: 10.8 UG/DL (ref 4.7–13.3)
UA SPECIFIC GRAVITY: 1.02 (ref 1–1.03)

## 2017-10-29 PROCEDURE — G0480 DRUG TEST DEF 1-7 CLASSES: HCPCS

## 2017-10-30 ENCOUNTER — HOSPITAL ENCOUNTER (INPATIENT)
Dept: HOSPITAL 1 - ED | Age: 22
LOS: 3 days | Discharge: HOME HEALTH | DRG: 383 | End: 2017-11-02
Attending: FAMILY MEDICINE | Admitting: FAMILY MEDICINE
Payer: COMMERCIAL

## 2017-10-30 VITALS — DIASTOLIC BLOOD PRESSURE: 54 MMHG | SYSTOLIC BLOOD PRESSURE: 81 MMHG

## 2017-10-30 VITALS — DIASTOLIC BLOOD PRESSURE: 36 MMHG | SYSTOLIC BLOOD PRESSURE: 89 MMHG

## 2017-10-30 VITALS — SYSTOLIC BLOOD PRESSURE: 97 MMHG | DIASTOLIC BLOOD PRESSURE: 52 MMHG

## 2017-10-30 VITALS — DIASTOLIC BLOOD PRESSURE: 28 MMHG | SYSTOLIC BLOOD PRESSURE: 104 MMHG

## 2017-10-30 VITALS — DIASTOLIC BLOOD PRESSURE: 50 MMHG | SYSTOLIC BLOOD PRESSURE: 125 MMHG

## 2017-10-30 VITALS — DIASTOLIC BLOOD PRESSURE: 28 MMHG | SYSTOLIC BLOOD PRESSURE: 83 MMHG

## 2017-10-30 VITALS
WEIGHT: 293 LBS | HEIGHT: 64 IN | BODY MASS INDEX: 50.02 KG/M2 | WEIGHT: 293 LBS | HEIGHT: 64 IN | BODY MASS INDEX: 50.02 KG/M2

## 2017-10-30 VITALS — SYSTOLIC BLOOD PRESSURE: 84 MMHG | DIASTOLIC BLOOD PRESSURE: 42 MMHG

## 2017-10-30 DIAGNOSIS — Z94.89: ICD-10-CM

## 2017-10-30 DIAGNOSIS — L03.115: Primary | ICD-10-CM

## 2017-10-30 DIAGNOSIS — F84.0: ICD-10-CM

## 2017-10-30 DIAGNOSIS — B95.62: ICD-10-CM

## 2017-10-30 DIAGNOSIS — E43: ICD-10-CM

## 2017-10-30 DIAGNOSIS — E87.1: ICD-10-CM

## 2017-10-30 DIAGNOSIS — D50.9: ICD-10-CM

## 2017-10-30 DIAGNOSIS — I89.0: ICD-10-CM

## 2017-10-30 DIAGNOSIS — N17.0: ICD-10-CM

## 2017-10-30 DIAGNOSIS — E03.9: ICD-10-CM

## 2017-10-30 DIAGNOSIS — E83.42: ICD-10-CM

## 2017-10-30 DIAGNOSIS — Z66: ICD-10-CM

## 2017-10-30 DIAGNOSIS — E66.01: ICD-10-CM

## 2017-10-30 LAB
ALBUMIN SERPL-MCNC: 2.6 G/DL (ref 3.4–5)
ALP SERPL-CCNC: 91 U/L (ref 46–116)
ALT SERPL-CCNC: 39 U/L (ref 14–59)
AMYLASE SERPL-CCNC: 18 U/L (ref 25–115)
AST SERPL-CCNC: 38 U/L (ref 15–37)
BASOPHILS NFR BLD: 0 % (ref 0–2)
BILIRUB SERPL-MCNC: 0.98 MG/DL (ref 0.2–1)
BUN SERPL-MCNC: 26 MG/DL (ref 7–18)
CALCIUM SERPL-MCNC: 8 MG/DL (ref 8.5–10.1)
CHLORIDE SERPL-SCNC: 103 MMOL/L (ref 98–107)
CHOLEST SERPL-MCNC: 81 MG/DL (ref ?–200)
CHOLEST SERPL-MCNC: 85 MG/DL (ref ?–200)
CHOLEST/HDLC SERPL: 2.3 MG/DL
CO2 SERPL-SCNC: 24.4 MMOL/L (ref 21–32)
CREAT SERPL-MCNC: 1.6 MG/DL (ref 0.6–1)
ERYTHROCYTE [DISTWIDTH] IN BLOOD BY AUTOMATED COUNT: 18 % (ref 11.5–14.5)
GFR SERPLBLD BASED ON 1.73 SQ M-ARVRAT: 43 ML/MIN
GLUCOSE SERPL-MCNC: 87 MG/DL (ref 74–106)
HDLC SERPL-MCNC: 36 MG/DL (ref 40–60)
HDLC SERPL-MCNC: 38 MG/DL (ref 40–60)
LIPASE SERPL-CCNC: 67 IU/L (ref 73–393)
MAGNESIUM SERPL-MCNC: 1.5 MG/DL (ref 1.8–2.4)
MONOCYTES NFR BLD: 2 % (ref 0–7)
NEUTS BAND NFR BLD: 7 % (ref 0–10)
NEUTS SEG NFR BLD MANUAL: 91 % (ref 37–75)
PHOSPHATE SERPL-MCNC: 3.9 MG/DL (ref 2.5–4.9)
PLAT MORPH BLD: (no result)
PLATELET # BLD: 243 X10^3MCL (ref 130–400)
POTASSIUM SERPL-SCNC: 4.8 MMOL/L (ref 3.5–5.1)
PROT SERPL-MCNC: 6.5 G/DL (ref 6.4–8.2)
RBC MORPH BLD: (no result)
SODIUM SERPL-SCNC: 135 MMOL/L (ref 136–145)
T3 SERPL-MCNC: 0.41 NG/ML
T3RU NFR SERPL: 37 % UPTAKE (ref 30–39)
T4 FREE SERPL-MCNC: 1.63 NG/DL (ref 0.76–1.46)
T4 SERPL-MCNC: 10.2 UG/DL (ref 4.7–13.3)
T4/T3 UPTAKE INDEX SERPL: 3.8 UG/DL (ref 1.4–4.5)
TRIGL SERPL-MCNC: 97 MG/DL (ref ?–150)

## 2017-10-30 NOTE — NUR
PT PRESENTS TO THE ER THIS MORNING FOR CULTURE CALL BACK. PT WAS HERE YESTERDAY
FOR CHILLS AND GENERALIZED WEAKNESS PER MOM. PT APEARS TO BE PALE ACCORDING TO
MOM. RESPIRATIONS EVEN AND UNLABORED. VITAL SIGNS STABLE. PT HAS A HX OF
LYMPODEMA OF BILATERAL LEGS. NO ACUTE DISTRESS NOTED.

## 2017-10-30 NOTE — NUR
BP = 84/42, MAP = 54.  ON TELE MONITOR. CHARGE NURSE AWARE. BOLUS OF NS
INFUSING NOW. WILL CONTINUE TO MONITOR.

## 2017-10-30 NOTE — NUR
RECEIVED PT FROM ED VIA RON, CAME IN DUE TO WEAKNESS, CHILLS AND RLE PAIN,
PT'S MOTHER STATED THAT SHE WAS CALLED FOR A GROWTH IN PT'S BLOOD CULTURE. PT
IS AWAKE AND ALERT. NO SOB NOTED. NO S/S OF CHEST PAIN/PRESSURE, SINUS
TACHYCARDIA ON THE MONITOR, HR . NO S/S OF ABDOMINAL DISCOMFORT. W/ HX
OF LYMPHEDEMA ON BLE. W/ OPEN WOUND ON THE RLE AND MILD REDNESS AND WARM TO
TOUCH ON RLE. W/ DARK DISCOLORATION ON BLE. SIDE RAILS UPX2. CALL LIGHT ON
REACH. MOTHER AT BEDSIDE. RECEIVED PT FROM ED W/ NS AND LEVAQUIN ONGOING.
PRIMARY NURSE AT BEDSIDE FOR CONTINUITY OF CARE

## 2017-10-30 NOTE — NUR
PT APPEARS TO BE RESTING COMFORTABLY. PT BECOMES AGITATED WHEN TRYING TO OBTAIN
BP. VITAL SIGNS STABLE. NO ACUTE DISTRESS NOTED.

## 2017-10-30 NOTE — NUR
PT LYING IN BED, ALERT BUT NONVERBAL, ANXIOUS, IV PATENT AND FLUSHING, MOTHER
AT BEDSIDE. OPEN WOUND RLF. HX AUTISM. NO OTHER COMPLAINTS AT THIS TIME. WILL
CONTINUE TO MONITOR.

## 2017-10-31 VITALS — SYSTOLIC BLOOD PRESSURE: 108 MMHG | DIASTOLIC BLOOD PRESSURE: 35 MMHG

## 2017-10-31 VITALS — SYSTOLIC BLOOD PRESSURE: 116 MMHG | DIASTOLIC BLOOD PRESSURE: 72 MMHG

## 2017-10-31 VITALS — DIASTOLIC BLOOD PRESSURE: 53 MMHG | SYSTOLIC BLOOD PRESSURE: 135 MMHG

## 2017-10-31 VITALS — DIASTOLIC BLOOD PRESSURE: 45 MMHG | SYSTOLIC BLOOD PRESSURE: 116 MMHG

## 2017-10-31 VITALS — DIASTOLIC BLOOD PRESSURE: 73 MMHG | SYSTOLIC BLOOD PRESSURE: 119 MMHG

## 2017-10-31 LAB
BASOPHILS NFR BLD: 0 % (ref 0–2)
BUN SERPL-MCNC: 32 MG/DL (ref 7–18)
CALCIUM SERPL-MCNC: 7.7 MG/DL (ref 8.5–10.1)
CHLORIDE SERPL-SCNC: 106 MMOL/L (ref 98–107)
CO2 SERPL-SCNC: 22.1 MMOL/L (ref 21–32)
CREAT SERPL-MCNC: 1.8 MG/DL (ref 0.6–1)
ERYTHROCYTE [DISTWIDTH] IN BLOOD BY AUTOMATED COUNT: 17.9 % (ref 11.5–14.5)
GFR SERPLBLD BASED ON 1.73 SQ M-ARVRAT: 37 ML/MIN
GLUCOSE SERPL-MCNC: 93 MG/DL (ref 74–106)
IRON SERPL-MCNC: 13 UG/DL (ref 50–170)
PLATELET # BLD: 190 X10^3MCL (ref 130–400)
POTASSIUM SERPL-SCNC: 4.5 MMOL/L (ref 3.5–5.1)
SODIUM SERPL-SCNC: 137 MMOL/L (ref 136–145)
TIBC SERPL-MCNC: 159 UG/DL (ref 250–450)

## 2017-10-31 NOTE — NUR
PATIENT RECEIVED RESTING IN BED. MENTALLY CHALLENGED. RESPIRATION EVEN AND
UNLABORED, ON ROOM AIR.  ONGOING 0.9% NS  CC/HR INFUSING WELL AT THE
LEFT FOREARM. INCONTINENT OF URINE. GENERALIZED WEAKNESS. NEEDS ASSISTANCE
WITH ADL'S. +LYMPHEDEMA TO BLE, BLE PRESENCE OF BLISTERS AND DISCOLORATION,
SMALL OPEN WOUND TO RLE. ON TELE #6. MOTHER AT THE BEDSIDE. ON CONTACT
ISOLATION FOR HX OF ESBL IN URINE. WILL CONTINUE TO MONITOR.

## 2017-10-31 NOTE — NUR
PT IN BED, MOTHER AT BEDSIDE.  NORCO PO GIVEN AS PT APPEARED RESTLESS AND
WOULD MOAN OCCASIONALLY. PT APPEARS MORE CALM AT THIS TIME. WILL CONTINUE TO
MONITOR.

## 2017-11-01 VITALS — SYSTOLIC BLOOD PRESSURE: 113 MMHG | DIASTOLIC BLOOD PRESSURE: 76 MMHG

## 2017-11-01 VITALS — DIASTOLIC BLOOD PRESSURE: 65 MMHG | SYSTOLIC BLOOD PRESSURE: 122 MMHG

## 2017-11-01 VITALS — DIASTOLIC BLOOD PRESSURE: 58 MMHG | SYSTOLIC BLOOD PRESSURE: 98 MMHG

## 2017-11-01 VITALS — DIASTOLIC BLOOD PRESSURE: 70 MMHG | SYSTOLIC BLOOD PRESSURE: 139 MMHG

## 2017-11-01 VITALS — SYSTOLIC BLOOD PRESSURE: 136 MMHG | DIASTOLIC BLOOD PRESSURE: 59 MMHG

## 2017-11-01 LAB
BASOPHILS NFR BLD: 0.1 % (ref 0–2)
BUN SERPL-MCNC: 39 MG/DL (ref 7–18)
CALCIUM SERPL-MCNC: 8 MG/DL (ref 8.5–10.1)
CHLORIDE SERPL-SCNC: 106 MMOL/L (ref 98–107)
CO2 SERPL-SCNC: 21.8 MMOL/L (ref 21–32)
CREAT SERPL-MCNC: 1.6 MG/DL (ref 0.6–1)
ERYTHROCYTE [DISTWIDTH] IN BLOOD BY AUTOMATED COUNT: 18.4 % (ref 11.5–14.5)
GFR SERPLBLD BASED ON 1.73 SQ M-ARVRAT: 43 ML/MIN
GLUCOSE SERPL-MCNC: 92 MG/DL (ref 74–106)
MAGNESIUM SERPL-MCNC: 1.8 MG/DL (ref 1.8–2.4)
PLATELET # BLD: 189 X10^3MCL (ref 130–400)
POTASSIUM SERPL-SCNC: 4.6 MMOL/L (ref 3.5–5.1)
SODIUM SERPL-SCNC: 137 MMOL/L (ref 136–145)

## 2017-11-01 NOTE — NUR
PT'S IV INFILTRATED. NO IV INSERTION SUCCESSFULLY AFTER MULTIPLE TRIES. PT'S
MOM REQUEST SWITCH PT'S IV ANTIBIOTICS TO PO. MADE DR. MYERS AWARE. NORCO
GIVEN PER PRN ORDER TO HELP PT RELAXE.

## 2017-11-01 NOTE — NUR
PATIENT DOZING ON AND OFF. RESPIRATION EVEN AND UNLABORED, ON ROOM AIR. NO
SIGN OF DISCOMFORT NOTED. IV SITE NO SIGN OF INFILTRATION. ASSISTED WITH
NEEDS. SAFETY OBSERVED. PLACED CALL LIGHT WITHIN REACH. KEPT CLEAN AND
DRY. MAINTAINED ON CONTACT ISOLATION FOR HX OF ESBL IN URINE. MOTHER STAYED
OVERNIGHT.

## 2017-11-01 NOTE — NUR
PT IS ON CONTACT ISOLATION. PT SEEN REST ON BED, MOANING. PT'S MOM AT BED
SIDE. PT NON VEBAL, PT'S MOM STATED PT IS NOT IN PAIN MORE BECAUSE OF ANXIOUS.
PT BREATHING ON RA, EVEN, UNLABORED. IV SITE PATENT, INTACT. IVF INFUSING
WELL.

## 2017-11-01 NOTE — NUR
PHARMACY CALLED TO VERIFIED PT'S PO ATIVAN AND ORAL ANTIBIOTICS. REPORTS GIVEN
TO RECEIVING NURSE. PT'S MOM AT BED SIDE. PT STAY CALM, NO DISTRESSED NOTED AT
THIS TIME.

## 2017-11-01 NOTE — NUR
PT IS ALERT BUT NON VERBAL, DUE TO AUTISM. UNABLE TO TELL WHAT SHE NEEDS. MOM
AT BEDSIDE, LUNG SOUND CLEAR BILATERAL, NO COUGH, NO S/S OF SOB, PT IS ON TELE
6, ST, NO S/S OF CHEST PAIN. BOWEL SOUND PRESENT ALL 4 QUADRANTS, NO
DISTENTION, NO TENDER. PT HAS LYMPHEDEMA BLE, SMALL OPEN WOUND TO RLE, PT NEED
TOTAL CARE. NO IV ACCESS,  MADE AWARE, ALL ADLS ASSIST, ALL NEED MET, CALL
LIGHT IN REACH, WILL CONTINUE TO MONITOR.

## 2017-11-02 ENCOUNTER — HOSPITAL ENCOUNTER (EMERGENCY)
Dept: HOSPITAL 1 - ED | Age: 22
Discharge: HOME | End: 2017-11-02
Payer: COMMERCIAL

## 2017-11-02 VITALS — SYSTOLIC BLOOD PRESSURE: 120 MMHG | DIASTOLIC BLOOD PRESSURE: 89 MMHG

## 2017-11-02 VITALS — DIASTOLIC BLOOD PRESSURE: 94 MMHG | SYSTOLIC BLOOD PRESSURE: 133 MMHG

## 2017-11-02 DIAGNOSIS — E03.9: ICD-10-CM

## 2017-11-02 DIAGNOSIS — F84.0: ICD-10-CM

## 2017-11-02 DIAGNOSIS — E66.01: ICD-10-CM

## 2017-11-02 DIAGNOSIS — Z00.8: Primary | ICD-10-CM

## 2017-11-02 DIAGNOSIS — G20: ICD-10-CM

## 2017-11-02 LAB
BASOPHILS NFR BLD: 0.5 % (ref 0–2)
BUN SERPL-MCNC: 37 MG/DL (ref 7–18)
CALCIUM SERPL-MCNC: 7.9 MG/DL (ref 8.5–10.1)
CHLORIDE SERPL-SCNC: 107 MMOL/L (ref 98–107)
CO2 SERPL-SCNC: 24.6 MMOL/L (ref 21–32)
CREAT SERPL-MCNC: 1.2 MG/DL (ref 0.6–1)
ERYTHROCYTE [DISTWIDTH] IN BLOOD BY AUTOMATED COUNT: 18.7 % (ref 11.5–14.5)
GFR SERPLBLD BASED ON 1.73 SQ M-ARVRAT: 60 ML/MIN
GLUCOSE SERPL-MCNC: 93 MG/DL (ref 74–106)
MAGNESIUM SERPL-MCNC: 1.8 MG/DL (ref 1.8–2.4)
PHOSPHATE SERPL-MCNC: 4.6 MG/DL (ref 2.5–4.9)
PLATELET # BLD: 205 X10^3MCL (ref 130–400)
POTASSIUM SERPL-SCNC: 4.7 MMOL/L (ref 3.5–5.1)
SODIUM SERPL-SCNC: 140 MMOL/L (ref 136–145)

## 2017-11-02 NOTE — NUR
MEDICAL ROUNDS WERE DONE WITH DR ALEX AND THE MEDICINE TEAM AT 0900. THE PLAN
TODAY IS TO DC HER HOME WITH MOM.

## 2017-11-02 NOTE — NUR
NO IV SITE. TOOK PHOTO OF BLE EDEMA AND RIGHT ANKLE WOUND. GAVE MOTHER
DISCHARGE INSTRUCTIONS, MEDS SENT TO PHARMACY.

## 2017-11-02 NOTE — NUR
PT IS AWAKE, NON VERBAL, NO S/S OF RESPIRATORY DISTRESS, NO S/S OF PAIN OR
DISCOMFORT, MOTHER AT BEDSIDE, ALL ADLS ASSIST, ALL NEED MET, CALL LIGHT IN
REACH, WILL CONTINUE TO MONITOR.

## 2017-11-02 NOTE — NUR
AAO TO SELF, NON VERBAL EXCEPT FOR GROANING AND WITHDRAWL TO TOUCH/CARE. LUNGS
CTA. NO SOB. O2 SAT ON RA 93%. BS'S ACTIVE TIMES 4. BLE VERY EDEMETOUS WITH
THICK BROWN SKIN AND CAULIFLOWER TYPE GROWTHS AND
FOUL SMELL. HAS ONE WOUND TO RIGHT ANKLE WITH SMALL
AMOUNT OF BLOOD ON SITE. NO IV, MOTHER REFUSES AS SHE WAS POKED MANY TIMES AND
SHE HAS HARD VEINS TO START IV WITH.

## 2017-11-24 ENCOUNTER — HOSPITAL ENCOUNTER (INPATIENT)
Dept: HOSPITAL 1 - ED | Age: 22
LOS: 3 days | Discharge: HOME HEALTH | DRG: 720 | End: 2017-11-27
Attending: FAMILY MEDICINE | Admitting: FAMILY MEDICINE
Payer: COMMERCIAL

## 2017-11-24 ENCOUNTER — HOSPITAL ENCOUNTER (EMERGENCY)
Dept: HOSPITAL 1 - ED | Age: 22
Discharge: HOME | End: 2017-11-24
Payer: COMMERCIAL

## 2017-11-24 VITALS — SYSTOLIC BLOOD PRESSURE: 142 MMHG | DIASTOLIC BLOOD PRESSURE: 75 MMHG

## 2017-11-24 VITALS
WEIGHT: 293 LBS | BODY MASS INDEX: 50.02 KG/M2 | WEIGHT: 293 LBS | HEIGHT: 64 IN | BODY MASS INDEX: 50.02 KG/M2 | HEIGHT: 64 IN

## 2017-11-24 VITALS — BODY MASS INDEX: 48.82 KG/M2 | HEIGHT: 65 IN | WEIGHT: 293 LBS

## 2017-11-24 VITALS — SYSTOLIC BLOOD PRESSURE: 127 MMHG | DIASTOLIC BLOOD PRESSURE: 54 MMHG

## 2017-11-24 DIAGNOSIS — E66.01: ICD-10-CM

## 2017-11-24 DIAGNOSIS — D64.9: ICD-10-CM

## 2017-11-24 DIAGNOSIS — E87.1: ICD-10-CM

## 2017-11-24 DIAGNOSIS — Z88.8: ICD-10-CM

## 2017-11-24 DIAGNOSIS — F84.0: ICD-10-CM

## 2017-11-24 DIAGNOSIS — L03.116: ICD-10-CM

## 2017-11-24 DIAGNOSIS — I16.0: ICD-10-CM

## 2017-11-24 DIAGNOSIS — A41.9: Primary | ICD-10-CM

## 2017-11-24 DIAGNOSIS — E43: ICD-10-CM

## 2017-11-24 DIAGNOSIS — Z88.0: ICD-10-CM

## 2017-11-24 DIAGNOSIS — Z88.1: ICD-10-CM

## 2017-11-24 DIAGNOSIS — R65.20: ICD-10-CM

## 2017-11-24 DIAGNOSIS — E83.42: ICD-10-CM

## 2017-11-24 DIAGNOSIS — I89.0: ICD-10-CM

## 2017-11-24 DIAGNOSIS — I10: ICD-10-CM

## 2017-11-24 DIAGNOSIS — L03.311: ICD-10-CM

## 2017-11-24 DIAGNOSIS — L03.311: Primary | ICD-10-CM

## 2017-11-24 DIAGNOSIS — N17.0: ICD-10-CM

## 2017-11-24 LAB
ALBUMIN SERPL-MCNC: 2.3 G/DL (ref 3.4–5)
ALP SERPL-CCNC: 129 U/L (ref 46–116)
ALT SERPL-CCNC: 22 U/L (ref 14–59)
AMYLASE SERPL-CCNC: 11 U/L (ref 25–115)
AST SERPL-CCNC: 19 U/L (ref 15–37)
BASOPHILS NFR BLD: 0.2 % (ref 0–2)
BILIRUB SERPL-MCNC: 1.44 MG/DL (ref 0.2–1)
BUN SERPL-MCNC: 19 MG/DL (ref 7–18)
CALCIUM SERPL-MCNC: 8.3 MG/DL (ref 8.5–10.1)
CHLORIDE SERPL-SCNC: 103 MMOL/L (ref 98–107)
CHOLEST SERPL-MCNC: 119 MG/DL (ref ?–200)
CHOLEST/HDLC SERPL: 2.8 MG/DL
CO2 SERPL-SCNC: 26.8 MMOL/L (ref 21–32)
CREAT SERPL-MCNC: 0.9 MG/DL (ref 0.6–1)
ERYTHROCYTE [DISTWIDTH] IN BLOOD BY AUTOMATED COUNT: 20.2 % (ref 11.5–14.5)
GFR SERPLBLD BASED ON 1.73 SQ M-ARVRAT: > 60 ML/MIN
GLUCOSE SERPL-MCNC: 100 MG/DL (ref 74–106)
HDLC SERPL-MCNC: 42 MG/DL (ref 40–60)
LIPASE SERPL-CCNC: 67 IU/L (ref 73–393)
MAGNESIUM SERPL-MCNC: 1.7 MG/DL (ref 1.8–2.4)
OVALOCYTES BLD QL SMEAR: (no result)
PHOSPHATE SERPL-MCNC: 4 MG/DL (ref 2.5–4.9)
PLATELET # BLD: 238 X10^3MCL (ref 130–400)
POTASSIUM SERPL-SCNC: 4 MMOL/L (ref 3.5–5.1)
PROT SERPL-MCNC: 7.1 G/DL (ref 6.4–8.2)
RBC MORPH BLD: (no result)
SODIUM SERPL-SCNC: 136 MMOL/L (ref 136–145)
T3 SERPL-MCNC: 1.2 NG/ML
T3RU NFR SERPL: 37 % UPTAKE (ref 30–39)
T4 FREE SERPL-MCNC: 1.92 NG/DL (ref 0.76–1.46)
T4 SERPL-MCNC: 10.9 UG/DL (ref 4.7–13.3)
T4/T3 UPTAKE INDEX SERPL: 4 UG/DL (ref 1.4–4.5)
TRIGL SERPL-MCNC: 78 MG/DL (ref ?–150)

## 2017-11-25 VITALS — SYSTOLIC BLOOD PRESSURE: 114 MMHG | DIASTOLIC BLOOD PRESSURE: 65 MMHG

## 2017-11-25 VITALS — SYSTOLIC BLOOD PRESSURE: 112 MMHG | DIASTOLIC BLOOD PRESSURE: 75 MMHG

## 2017-11-25 VITALS — SYSTOLIC BLOOD PRESSURE: 120 MMHG | DIASTOLIC BLOOD PRESSURE: 56 MMHG

## 2017-11-25 VITALS — DIASTOLIC BLOOD PRESSURE: 57 MMHG | SYSTOLIC BLOOD PRESSURE: 128 MMHG

## 2017-11-25 VITALS — SYSTOLIC BLOOD PRESSURE: 131 MMHG | DIASTOLIC BLOOD PRESSURE: 81 MMHG

## 2017-11-25 LAB
BASOPHILS NFR BLD: 0 % (ref 0–2)
BUN SERPL-MCNC: 19 MG/DL (ref 7–18)
CALCIUM SERPL-MCNC: 7.7 MG/DL (ref 8.5–10.1)
CHLORIDE SERPL-SCNC: 107 MMOL/L (ref 98–107)
CO2 SERPL-SCNC: 22 MMOL/L (ref 21–32)
CREAT SERPL-MCNC: 0.8 MG/DL (ref 0.6–1)
ERYTHROCYTE [DISTWIDTH] IN BLOOD BY AUTOMATED COUNT: 20.2 % (ref 11.5–14.5)
GFR SERPLBLD BASED ON 1.73 SQ M-ARVRAT: > 60 ML/MIN
GLUCOSE SERPL-MCNC: 92 MG/DL (ref 74–106)
METAMYELOCYTES NFR BLD: 1 % (ref 0–2)
MONOCYTES NFR BLD: 3 % (ref 0–7)
NEUTS BAND NFR BLD: 8 % (ref 0–10)
NEUTS SEG NFR BLD MANUAL: 82 % (ref 37–75)
OVALOCYTES BLD QL SMEAR: (no result)
PLAT MORPH BLD: (no result)
PLATELET # BLD: 181 X10^3MCL (ref 130–400)
POTASSIUM SERPL-SCNC: 4.4 MMOL/L (ref 3.5–5.1)
RBC MORPH BLD: (no result)
SODIUM SERPL-SCNC: 135 MMOL/L (ref 136–145)

## 2017-11-26 VITALS — SYSTOLIC BLOOD PRESSURE: 145 MMHG | DIASTOLIC BLOOD PRESSURE: 70 MMHG

## 2017-11-26 VITALS — DIASTOLIC BLOOD PRESSURE: 69 MMHG | SYSTOLIC BLOOD PRESSURE: 108 MMHG

## 2017-11-26 VITALS — DIASTOLIC BLOOD PRESSURE: 52 MMHG | SYSTOLIC BLOOD PRESSURE: 128 MMHG

## 2017-11-26 VITALS — SYSTOLIC BLOOD PRESSURE: 118 MMHG | DIASTOLIC BLOOD PRESSURE: 68 MMHG

## 2017-11-26 VITALS — DIASTOLIC BLOOD PRESSURE: 88 MMHG | SYSTOLIC BLOOD PRESSURE: 135 MMHG

## 2017-11-26 LAB
BASOPHILS NFR BLD: 0 % (ref 0–2)
BUN SERPL-MCNC: 17 MG/DL (ref 7–18)
CALCIUM SERPL-MCNC: 7.9 MG/DL (ref 8.5–10.1)
CHLORIDE SERPL-SCNC: 107 MMOL/L (ref 98–107)
CO2 SERPL-SCNC: 21.9 MMOL/L (ref 21–32)
CREAT SERPL-MCNC: 0.7 MG/DL (ref 0.6–1)
ERYTHROCYTE [DISTWIDTH] IN BLOOD BY AUTOMATED COUNT: 20.9 % (ref 11.5–14.5)
GFR SERPLBLD BASED ON 1.73 SQ M-ARVRAT: > 60 ML/MIN
GLUCOSE SERPL-MCNC: 82 MG/DL (ref 74–106)
MAGNESIUM SERPL-MCNC: 1.8 MG/DL (ref 1.8–2.4)
OVALOCYTES BLD QL SMEAR: (no result)
PHOSPHATE SERPL-MCNC: 4 MG/DL (ref 2.5–4.9)
PLATELET # BLD: 189 X10^3MCL (ref 130–400)
POTASSIUM SERPL-SCNC: 4 MMOL/L (ref 3.5–5.1)
RBC MORPH BLD: (no result)
SODIUM SERPL-SCNC: 137 MMOL/L (ref 136–145)

## 2017-11-27 VITALS — SYSTOLIC BLOOD PRESSURE: 136 MMHG | DIASTOLIC BLOOD PRESSURE: 88 MMHG

## 2017-11-27 VITALS — DIASTOLIC BLOOD PRESSURE: 67 MMHG | SYSTOLIC BLOOD PRESSURE: 135 MMHG

## 2017-11-27 LAB
BASOPHILS NFR BLD: 0.2 % (ref 0–2)
BUN SERPL-MCNC: 15 MG/DL (ref 7–18)
CALCIUM SERPL-MCNC: 7.7 MG/DL (ref 8.5–10.1)
CHLORIDE SERPL-SCNC: 108 MMOL/L (ref 98–107)
CO2 SERPL-SCNC: 23.6 MMOL/L (ref 21–32)
CREAT SERPL-MCNC: 0.6 MG/DL (ref 0.6–1)
ERYTHROCYTE [DISTWIDTH] IN BLOOD BY AUTOMATED COUNT: 20.4 % (ref 11.5–14.5)
GFR SERPLBLD BASED ON 1.73 SQ M-ARVRAT: > 60 ML/MIN
GLUCOSE SERPL-MCNC: 71 MG/DL (ref 74–106)
PLATELET # BLD: 205 X10^3MCL (ref 130–400)
POTASSIUM SERPL-SCNC: 3.7 MMOL/L (ref 3.5–5.1)
RBC MORPH BLD: (no result)
SODIUM SERPL-SCNC: 139 MMOL/L (ref 136–145)

## 2018-01-10 ENCOUNTER — HOSPITAL ENCOUNTER (INPATIENT)
Dept: HOSPITAL 1 - ED | Age: 23
LOS: 7 days | Discharge: HOME HEALTH | DRG: 720 | End: 2018-01-17
Attending: FAMILY MEDICINE | Admitting: FAMILY MEDICINE
Payer: COMMERCIAL

## 2018-01-10 VITALS
HEIGHT: 64 IN | WEIGHT: 293 LBS | HEIGHT: 64 IN | WEIGHT: 293 LBS | BODY MASS INDEX: 50.02 KG/M2 | BODY MASS INDEX: 50.02 KG/M2

## 2018-01-10 VITALS — SYSTOLIC BLOOD PRESSURE: 107 MMHG | DIASTOLIC BLOOD PRESSURE: 54 MMHG

## 2018-01-10 VITALS — SYSTOLIC BLOOD PRESSURE: 141 MMHG | DIASTOLIC BLOOD PRESSURE: 47 MMHG

## 2018-01-10 VITALS — DIASTOLIC BLOOD PRESSURE: 55 MMHG | SYSTOLIC BLOOD PRESSURE: 111 MMHG

## 2018-01-10 DIAGNOSIS — E66.01: ICD-10-CM

## 2018-01-10 DIAGNOSIS — F41.1: ICD-10-CM

## 2018-01-10 DIAGNOSIS — Z16.29: ICD-10-CM

## 2018-01-10 DIAGNOSIS — E87.5: ICD-10-CM

## 2018-01-10 DIAGNOSIS — R65.20: ICD-10-CM

## 2018-01-10 DIAGNOSIS — E83.42: ICD-10-CM

## 2018-01-10 DIAGNOSIS — E43: ICD-10-CM

## 2018-01-10 DIAGNOSIS — B95.61: ICD-10-CM

## 2018-01-10 DIAGNOSIS — D50.9: ICD-10-CM

## 2018-01-10 DIAGNOSIS — I89.0: ICD-10-CM

## 2018-01-10 DIAGNOSIS — R47.01: ICD-10-CM

## 2018-01-10 DIAGNOSIS — E83.39: ICD-10-CM

## 2018-01-10 DIAGNOSIS — F84.0: ICD-10-CM

## 2018-01-10 DIAGNOSIS — N17.0: ICD-10-CM

## 2018-01-10 DIAGNOSIS — E03.9: ICD-10-CM

## 2018-01-10 DIAGNOSIS — A40.0: Primary | ICD-10-CM

## 2018-01-10 DIAGNOSIS — L03.116: ICD-10-CM

## 2018-01-10 LAB
ALBUMIN SERPL-MCNC: 2.3 G/DL (ref 3.4–5)
ALP SERPL-CCNC: 99 U/L (ref 46–116)
ALT SERPL-CCNC: 23 U/L (ref 14–59)
AMYLASE SERPL-CCNC: 14 U/L (ref 25–115)
AST SERPL-CCNC: 23 U/L (ref 15–37)
BASOPHILS NFR BLD: 0 % (ref 0–2)
BILIRUB SERPL-MCNC: 0.93 MG/DL (ref 0.2–1)
BUN SERPL-MCNC: 24 MG/DL (ref 7–18)
CALCIUM SERPL-MCNC: 8.2 MG/DL (ref 8.5–10.1)
CHLORIDE SERPL-SCNC: 104 MMOL/L (ref 98–107)
CHOLEST SERPL-MCNC: 96 MG/DL (ref ?–200)
CHOLEST/HDLC SERPL: 2 MG/DL
CK MB SERPL-MCNC: 1 NG/ML (ref 0–3.6)
CK SERPL-CCNC: 191 U/L (ref 26–192)
CO2 SERPL-SCNC: 24.6 MMOL/L (ref 21–32)
CREAT SERPL-MCNC: 1.1 MG/DL (ref 0.6–1)
CRP SERPL-MCNC: 17.3 MG/DL (ref ?–0.9)
ERYTHROCYTE [DISTWIDTH] IN BLOOD BY AUTOMATED COUNT: 16.6 % (ref 11.5–14.5)
ERYTHROCYTE [SEDIMENTATION RATE] IN BLOOD BY WESTERGREN METHOD: 43 MM/HR (ref 0–20)
GFR SERPLBLD BASED ON 1.73 SQ M-ARVRAT: > 60 ML/MIN
GLUCOSE SERPL-MCNC: 93 MG/DL (ref 74–106)
HDLC SERPL-MCNC: 47 MG/DL (ref 40–60)
MAGNESIUM SERPL-MCNC: 1.5 MG/DL (ref 1.8–2.4)
NEUTS BAND NFR BLD: 27 % (ref 0–10)
NEUTS SEG NFR BLD MANUAL: 67 % (ref 37–75)
PHOSPHATE SERPL-MCNC: 4.2 MG/DL (ref 2.5–4.9)
PLAT MORPH BLD: (no result)
PLATELET # BLD: 277 X10^3MCL (ref 130–400)
POTASSIUM SERPL-SCNC: 4.3 MMOL/L (ref 3.5–5.1)
PROT SERPL-MCNC: 6.8 G/DL (ref 6.4–8.2)
RBC MORPH BLD: (no result)
SODIUM SERPL-SCNC: 136 MMOL/L (ref 136–145)
T3 SERPL-MCNC: 0.62 NG/ML
T3RU NFR SERPL: 35 % UPTAKE (ref 30–39)
T4 FREE SERPL-MCNC: 1.64 NG/DL (ref 0.76–1.46)
T4 SERPL-MCNC: 8.3 UG/DL (ref 4.7–13.3)
T4/T3 UPTAKE INDEX SERPL: 2.9 UG/DL (ref 1.4–4.5)
TRIGL SERPL-MCNC: 74 MG/DL (ref ?–150)

## 2018-01-10 PROCEDURE — A4301 IMPLANTABLE ACCESS SYST PERC: HCPCS

## 2018-01-11 VITALS — DIASTOLIC BLOOD PRESSURE: 40 MMHG | SYSTOLIC BLOOD PRESSURE: 100 MMHG

## 2018-01-11 VITALS — DIASTOLIC BLOOD PRESSURE: 60 MMHG | SYSTOLIC BLOOD PRESSURE: 99 MMHG

## 2018-01-11 VITALS — DIASTOLIC BLOOD PRESSURE: 41 MMHG | SYSTOLIC BLOOD PRESSURE: 105 MMHG

## 2018-01-11 VITALS — DIASTOLIC BLOOD PRESSURE: 45 MMHG | SYSTOLIC BLOOD PRESSURE: 100 MMHG

## 2018-01-11 LAB
BUN SERPL-MCNC: 33 MG/DL (ref 7–18)
CALCIUM SERPL-MCNC: 7.8 MG/DL (ref 8.5–10.1)
CHLORIDE SERPL-SCNC: 103 MMOL/L (ref 98–107)
CO2 SERPL-SCNC: 23.5 MMOL/L (ref 21–32)
CREAT SERPL-MCNC: 1.8 MG/DL (ref 0.6–1)
ERYTHROCYTE [DISTWIDTH] IN BLOOD BY AUTOMATED COUNT: 18.5 % (ref 11.5–14.5)
GFR SERPLBLD BASED ON 1.73 SQ M-ARVRAT: 37 ML/MIN
GLUCOSE SERPL-MCNC: 89 MG/DL (ref 74–106)
MAGNESIUM SERPL-MCNC: 1.7 MG/DL (ref 1.8–2.4)
MONOCYTES NFR BLD: 2 % (ref 0–7)
NEUTS BAND NFR BLD: 19 % (ref 0–10)
NEUTS SEG NFR BLD MANUAL: 75 % (ref 37–75)
PHOSPHATE SERPL-MCNC: 5.8 MG/DL (ref 2.5–4.9)
PLATELET # BLD: 243 X10^3MCL (ref 130–400)
POTASSIUM SERPL-SCNC: 5.6 MMOL/L (ref 3.5–5.1)
RBC MORPH BLD: (no result)
SODIUM SERPL-SCNC: 136 MMOL/L (ref 136–145)

## 2018-01-12 VITALS — SYSTOLIC BLOOD PRESSURE: 107 MMHG | DIASTOLIC BLOOD PRESSURE: 44 MMHG

## 2018-01-12 VITALS — DIASTOLIC BLOOD PRESSURE: 38 MMHG | SYSTOLIC BLOOD PRESSURE: 113 MMHG

## 2018-01-12 VITALS — DIASTOLIC BLOOD PRESSURE: 72 MMHG | SYSTOLIC BLOOD PRESSURE: 128 MMHG

## 2018-01-12 VITALS — SYSTOLIC BLOOD PRESSURE: 105 MMHG | DIASTOLIC BLOOD PRESSURE: 45 MMHG

## 2018-01-12 LAB
BASOPHILS NFR BLD: 0 % (ref 0–2)
BUN SERPL-MCNC: 44 MG/DL (ref 7–18)
CALCIUM SERPL-MCNC: 8.1 MG/DL (ref 8.5–10.1)
CHLORIDE SERPL-SCNC: 102 MMOL/L (ref 98–107)
CO2 SERPL-SCNC: 24.5 MMOL/L (ref 21–32)
CREAT SERPL-MCNC: 2.7 MG/DL (ref 0.6–1)
ERYTHROCYTE [DISTWIDTH] IN BLOOD BY AUTOMATED COUNT: 18.9 % (ref 11.5–14.5)
GFR SERPLBLD BASED ON 1.73 SQ M-ARVRAT: 23 ML/MIN
GLUCOSE SERPL-MCNC: 98 MG/DL (ref 74–106)
MAGNESIUM SERPL-MCNC: 2 MG/DL (ref 1.8–2.4)
MONOCYTES NFR BLD: 2 % (ref 0–7)
NEUTS BAND NFR BLD: 10 % (ref 0–10)
NEUTS SEG NFR BLD MANUAL: 82 % (ref 37–75)
PHOSPHATE SERPL-MCNC: 6.4 MG/DL (ref 2.5–4.9)
PLAT MORPH BLD: (no result)
PLATELET # BLD: 253 X10^3MCL (ref 130–400)
POTASSIUM SERPL-SCNC: 5.2 MMOL/L (ref 3.5–5.1)
RBC MORPH BLD: (no result)
SODIUM SERPL-SCNC: 136 MMOL/L (ref 136–145)

## 2018-01-12 PROCEDURE — 05HM33Z INSERTION OF INFUSION DEVICE INTO RIGHT INTERNAL JUGULAR VEIN, PERCUTANEOUS APPROACH: ICD-10-PCS | Performed by: SURGERY

## 2018-01-13 VITALS — SYSTOLIC BLOOD PRESSURE: 106 MMHG | DIASTOLIC BLOOD PRESSURE: 55 MMHG

## 2018-01-13 VITALS — SYSTOLIC BLOOD PRESSURE: 124 MMHG | DIASTOLIC BLOOD PRESSURE: 60 MMHG

## 2018-01-13 VITALS — SYSTOLIC BLOOD PRESSURE: 129 MMHG | DIASTOLIC BLOOD PRESSURE: 57 MMHG

## 2018-01-13 VITALS — SYSTOLIC BLOOD PRESSURE: 120 MMHG | DIASTOLIC BLOOD PRESSURE: 57 MMHG

## 2018-01-13 LAB
BASOPHILS NFR BLD: 0 % (ref 0–2)
BUN SERPL-MCNC: 53 MG/DL (ref 7–18)
BUN SERPL-MCNC: 58 MG/DL (ref 7–18)
CALCIUM SERPL-MCNC: 8 MG/DL (ref 8.5–10.1)
CALCIUM SERPL-MCNC: 8.3 MG/DL (ref 8.5–10.1)
CHLORIDE SERPL-SCNC: 104 MMOL/L (ref 98–107)
CHLORIDE SERPL-SCNC: 104 MMOL/L (ref 98–107)
CO2 SERPL-SCNC: 22.5 MMOL/L (ref 21–32)
CO2 SERPL-SCNC: 22.5 MMOL/L (ref 21–32)
CREAT SERPL-MCNC: 2.8 MG/DL (ref 0.6–1)
CREAT SERPL-MCNC: 2.9 MG/DL (ref 0.6–1)
ERYTHROCYTE [DISTWIDTH] IN BLOOD BY AUTOMATED COUNT: 19.1 % (ref 11.5–14.5)
GFR SERPLBLD BASED ON 1.73 SQ M-ARVRAT: 22 ML/MIN
GFR SERPLBLD BASED ON 1.73 SQ M-ARVRAT: 22 ML/MIN
GLUCOSE SERPL-MCNC: 87 MG/DL (ref 74–106)
GLUCOSE SERPL-MCNC: 99 MG/DL (ref 74–106)
MICROSCOPIC UR-IMP: YES
MONOCYTES NFR BLD: 2 % (ref 0–7)
NEUTS BAND NFR BLD: 2 % (ref 0–10)
NEUTS SEG NFR BLD MANUAL: 92 % (ref 37–75)
PHOSPHATE SERPL-MCNC: 5.8 MG/DL (ref 2.5–4.9)
PLAT MORPH BLD: (no result)
PLATELET # BLD: 250 X10^3MCL (ref 130–400)
POTASSIUM SERPL-SCNC: 5 MMOL/L (ref 3.5–5.1)
POTASSIUM SERPL-SCNC: 5.1 MMOL/L (ref 3.5–5.1)
RBC # UR STRIP.AUTO: (no result) /UL
RBC MORPH BLD: (no result)
SODIUM SERPL-SCNC: 136 MMOL/L (ref 136–145)
SODIUM SERPL-SCNC: 137 MMOL/L (ref 136–145)
UA SPECIFIC GRAVITY: >=1.03 (ref 1–1.03)
VARIANT LYMPHS NFR BLD MANUAL: 1 %

## 2018-01-14 VITALS — SYSTOLIC BLOOD PRESSURE: 109 MMHG | DIASTOLIC BLOOD PRESSURE: 61 MMHG

## 2018-01-14 VITALS — SYSTOLIC BLOOD PRESSURE: 126 MMHG | DIASTOLIC BLOOD PRESSURE: 71 MMHG

## 2018-01-14 VITALS — DIASTOLIC BLOOD PRESSURE: 62 MMHG | SYSTOLIC BLOOD PRESSURE: 119 MMHG

## 2018-01-14 VITALS — SYSTOLIC BLOOD PRESSURE: 125 MMHG | DIASTOLIC BLOOD PRESSURE: 61 MMHG

## 2018-01-14 LAB
BASOPHILS NFR BLD: 0 % (ref 0–2)
BUN SERPL-MCNC: 65 MG/DL (ref 7–18)
CALCIUM SERPL-MCNC: 8.3 MG/DL (ref 8.5–10.1)
CHLORIDE SERPL-SCNC: 104 MMOL/L (ref 98–107)
CO2 SERPL-SCNC: 22.9 MMOL/L (ref 21–32)
CREAT SERPL-MCNC: 2.8 MG/DL (ref 0.6–1)
ERYTHROCYTE [DISTWIDTH] IN BLOOD BY AUTOMATED COUNT: 19.2 % (ref 11.5–14.5)
GFR SERPLBLD BASED ON 1.73 SQ M-ARVRAT: 22 ML/MIN
GLUCOSE SERPL-MCNC: 85 MG/DL (ref 74–106)
PLATELET # BLD: 236 X10^3MCL (ref 130–400)
POTASSIUM SERPL-SCNC: 5.1 MMOL/L (ref 3.5–5.1)
SODIUM SERPL-SCNC: 136 MMOL/L (ref 136–145)

## 2018-01-15 VITALS — SYSTOLIC BLOOD PRESSURE: 120 MMHG | DIASTOLIC BLOOD PRESSURE: 55 MMHG

## 2018-01-15 VITALS — SYSTOLIC BLOOD PRESSURE: 122 MMHG | DIASTOLIC BLOOD PRESSURE: 54 MMHG

## 2018-01-15 VITALS — DIASTOLIC BLOOD PRESSURE: 55 MMHG | SYSTOLIC BLOOD PRESSURE: 112 MMHG

## 2018-01-15 VITALS — DIASTOLIC BLOOD PRESSURE: 68 MMHG | SYSTOLIC BLOOD PRESSURE: 128 MMHG

## 2018-01-15 LAB
BASOPHILS NFR BLD: 0.2 % (ref 0–2)
BUN SERPL-MCNC: 69 MG/DL (ref 7–18)
CALCIUM SERPL-MCNC: 8.1 MG/DL (ref 8.5–10.1)
CHLORIDE SERPL-SCNC: 104 MMOL/L (ref 98–107)
CO2 SERPL-SCNC: 25 MMOL/L (ref 21–32)
CREAT SERPL-MCNC: 2.2 MG/DL (ref 0.6–1)
ERYTHROCYTE [DISTWIDTH] IN BLOOD BY AUTOMATED COUNT: 18.4 % (ref 11.5–14.5)
GFR SERPLBLD BASED ON 1.73 SQ M-ARVRAT: 30 ML/MIN
GLUCOSE SERPL-MCNC: 86 MG/DL (ref 74–106)
MAGNESIUM SERPL-MCNC: 2.1 MG/DL (ref 1.8–2.4)
PHOSPHATE SERPL-MCNC: 5.3 MG/DL (ref 2.5–4.9)
PLATELET # BLD: 250 X10^3MCL (ref 130–400)
POTASSIUM SERPL-SCNC: 4.5 MMOL/L (ref 3.5–5.1)
RBC MORPH BLD: (no result)
SODIUM SERPL-SCNC: 137 MMOL/L (ref 136–145)

## 2018-01-16 VITALS — SYSTOLIC BLOOD PRESSURE: 131 MMHG | DIASTOLIC BLOOD PRESSURE: 90 MMHG

## 2018-01-16 VITALS — SYSTOLIC BLOOD PRESSURE: 107 MMHG | DIASTOLIC BLOOD PRESSURE: 74 MMHG

## 2018-01-16 VITALS — SYSTOLIC BLOOD PRESSURE: 98 MMHG | DIASTOLIC BLOOD PRESSURE: 58 MMHG

## 2018-01-16 VITALS — SYSTOLIC BLOOD PRESSURE: 125 MMHG | DIASTOLIC BLOOD PRESSURE: 68 MMHG

## 2018-01-16 LAB
BASOPHILS NFR BLD: 0.2 % (ref 0–2)
BUN SERPL-MCNC: 61 MG/DL (ref 7–18)
CALCIUM SERPL-MCNC: 8.2 MG/DL (ref 8.5–10.1)
CHLORIDE SERPL-SCNC: 104 MMOL/L (ref 98–107)
CO2 SERPL-SCNC: 24.2 MMOL/L (ref 21–32)
CREAT SERPL-MCNC: 1.7 MG/DL (ref 0.6–1)
ERYTHROCYTE [DISTWIDTH] IN BLOOD BY AUTOMATED COUNT: 18.4 % (ref 11.5–14.5)
GFR SERPLBLD BASED ON 1.73 SQ M-ARVRAT: 40 ML/MIN
GLUCOSE SERPL-MCNC: 92 MG/DL (ref 74–106)
PHOSPHATE SERPL-MCNC: 4.5 MG/DL (ref 2.5–4.9)
PLATELET # BLD: 257 X10^3MCL (ref 130–400)
POTASSIUM SERPL-SCNC: 4.1 MMOL/L (ref 3.5–5.1)
SODIUM SERPL-SCNC: 139 MMOL/L (ref 136–145)

## 2018-01-17 VITALS — DIASTOLIC BLOOD PRESSURE: 70 MMHG | SYSTOLIC BLOOD PRESSURE: 124 MMHG

## 2018-01-17 VITALS — SYSTOLIC BLOOD PRESSURE: 124 MMHG | DIASTOLIC BLOOD PRESSURE: 70 MMHG

## 2018-01-17 LAB
BASOPHILS NFR BLD: 0.1 % (ref 0–2)
BUN SERPL-MCNC: 51 MG/DL (ref 7–18)
CALCIUM SERPL-MCNC: 8.1 MG/DL (ref 8.5–10.1)
CHLORIDE SERPL-SCNC: 105 MMOL/L (ref 98–107)
CO2 SERPL-SCNC: 26.2 MMOL/L (ref 21–32)
CREAT SERPL-MCNC: 1.3 MG/DL (ref 0.6–1)
ERYTHROCYTE [DISTWIDTH] IN BLOOD BY AUTOMATED COUNT: 18.3 % (ref 11.5–14.5)
GFR SERPLBLD BASED ON 1.73 SQ M-ARVRAT: 54 ML/MIN
GLUCOSE SERPL-MCNC: 88 MG/DL (ref 74–106)
PLATELET # BLD: 226 X10^3MCL (ref 130–400)
POTASSIUM SERPL-SCNC: 3.8 MMOL/L (ref 3.5–5.1)
RBC MORPH BLD: (no result)
SODIUM SERPL-SCNC: 139 MMOL/L (ref 136–145)

## 2018-11-29 ENCOUNTER — HOSPITAL ENCOUNTER (INPATIENT)
Dept: HOSPITAL 1 - ED | Age: 23
LOS: 13 days | Discharge: HOSPICE | DRG: 720 | End: 2018-12-12
Attending: FAMILY MEDICINE | Admitting: FAMILY MEDICINE
Payer: COMMERCIAL

## 2018-11-29 VITALS — SYSTOLIC BLOOD PRESSURE: 110 MMHG | DIASTOLIC BLOOD PRESSURE: 68 MMHG

## 2018-11-29 VITALS — SYSTOLIC BLOOD PRESSURE: 103 MMHG | DIASTOLIC BLOOD PRESSURE: 67 MMHG

## 2018-11-29 VITALS
WEIGHT: 293 LBS | HEIGHT: 64 IN | HEIGHT: 64 IN | BODY MASS INDEX: 50.02 KG/M2 | WEIGHT: 293 LBS | BODY MASS INDEX: 50.02 KG/M2

## 2018-11-29 DIAGNOSIS — E03.9: ICD-10-CM

## 2018-11-29 DIAGNOSIS — Z53.29: ICD-10-CM

## 2018-11-29 DIAGNOSIS — D64.9: ICD-10-CM

## 2018-11-29 DIAGNOSIS — J96.02: ICD-10-CM

## 2018-11-29 DIAGNOSIS — R13.10: ICD-10-CM

## 2018-11-29 DIAGNOSIS — R68.0: ICD-10-CM

## 2018-11-29 DIAGNOSIS — A41.9: Primary | ICD-10-CM

## 2018-11-29 DIAGNOSIS — J69.0: ICD-10-CM

## 2018-11-29 DIAGNOSIS — D61.818: ICD-10-CM

## 2018-11-29 DIAGNOSIS — N17.0: ICD-10-CM

## 2018-11-29 DIAGNOSIS — L97.929: ICD-10-CM

## 2018-11-29 DIAGNOSIS — F84.0: ICD-10-CM

## 2018-11-29 DIAGNOSIS — I89.8: ICD-10-CM

## 2018-11-29 DIAGNOSIS — E43: ICD-10-CM

## 2018-11-29 DIAGNOSIS — R65.20: ICD-10-CM

## 2018-11-29 DIAGNOSIS — J96.21: ICD-10-CM

## 2018-11-29 DIAGNOSIS — E66.01: ICD-10-CM

## 2018-11-29 DIAGNOSIS — D69.59: ICD-10-CM

## 2018-11-29 DIAGNOSIS — E87.5: ICD-10-CM

## 2018-11-29 LAB
ALBUMIN SERPL-MCNC: 2.8 G/DL (ref 3.4–5)
ALP SERPL-CCNC: 125 U/L (ref 46–116)
ALT SERPL-CCNC: 111 U/L (ref 14–59)
AMPHETAMINES UR QL SCN: (no result)
AST SERPL-CCNC: 108 U/L (ref 15–37)
BASOPHILS NFR BLD: 0 % (ref 0–2)
BILIRUB SERPL-MCNC: 0.7 MG/DL (ref 0.2–1)
BUN SERPL-MCNC: 17 MG/DL (ref 7–18)
CALCIUM SERPL-MCNC: 8.4 MG/DL (ref 8.5–10.1)
CHLORIDE SERPL-SCNC: 107 MMOL/L (ref 98–107)
CO2 SERPL-SCNC: 29.7 MMOL/L (ref 21–32)
CREAT SERPL-MCNC: 0.7 MG/DL (ref 0.6–1)
DACRYOCYTES BLD QL SMEAR: (no result)
ERYTHROCYTE [DISTWIDTH] IN BLOOD BY AUTOMATED COUNT: 19.3 % (ref 11.5–14.5)
ERYTHROCYTE [DISTWIDTH] IN BLOOD BY AUTOMATED COUNT: 19.7 % (ref 11.5–14.5)
GFR SERPLBLD BASED ON 1.73 SQ M-ARVRAT: > 60 ML/MIN
GLUCOSE SERPL-MCNC: 84 MG/DL (ref 74–106)
LIPASE SERPL-CCNC: 107 IU/L (ref 73–393)
MAGNESIUM SERPL-MCNC: 2.1 MG/DL (ref 1.8–2.4)
MICROSCOPIC UR-IMP: YES
MONOCYTES NFR BLD: 2 % (ref 0–7)
MONOCYTES NFR BLD: 4 % (ref 0–7)
NEUTS BAND NFR BLD: 5 % (ref 0–10)
NEUTS BAND NFR BLD: 8 % (ref 0–10)
NEUTS SEG NFR BLD MANUAL: 72 % (ref 37–75)
NEUTS SEG NFR BLD MANUAL: 77 % (ref 37–75)
OVALOCYTES BLD QL SMEAR: (no result)
PHOSPHATE SERPL-MCNC: 4.5 MG/DL (ref 2.5–4.9)
PLAT MORPH BLD: (no result)
PLAT MORPH BLD: (no result)
PLATELET # BLD: 30 X10^3MCL (ref 130–400)
PLATELET # BLD: 44 X10^3MCL (ref 130–400)
POTASSIUM SERPL-SCNC: 5.2 MMOL/L (ref 3.5–5.1)
PROT SERPL-MCNC: 7.6 G/DL (ref 6.4–8.2)
RBC # UR STRIP.AUTO: (no result) /UL
RBC MORPH BLD: (no result)
RBC MORPH BLD: (no result)
SODIUM SERPL-SCNC: 142 MMOL/L (ref 136–145)
T3 SERPL-MCNC: 0.65 NG/ML
T3RU NFR SERPL: 33 % UPTAKE (ref 30–39)
T4 FREE SERPL-MCNC: 1.01 NG/DL (ref 0.76–1.46)
T4 SERPL-MCNC: 7 UG/DL (ref 4.7–13.3)
T4/T3 UPTAKE INDEX SERPL: 2.3 UG/DL (ref 1.4–4.5)
UA SPECIFIC GRAVITY: 1.02 (ref 1–1.03)

## 2018-11-29 PROCEDURE — 30233N1 TRANSFUSION OF NONAUTOLOGOUS RED BLOOD CELLS INTO PERIPHERAL VEIN, PERCUTANEOUS APPROACH: ICD-10-PCS | Performed by: FAMILY MEDICINE

## 2018-11-29 PROCEDURE — A4628 OROPHARYNGEAL SUCTION CATH: HCPCS

## 2018-11-29 PROCEDURE — P9016 RBC LEUKOCYTES REDUCED: HCPCS

## 2018-11-30 VITALS — DIASTOLIC BLOOD PRESSURE: 46 MMHG | SYSTOLIC BLOOD PRESSURE: 119 MMHG

## 2018-11-30 VITALS — DIASTOLIC BLOOD PRESSURE: 46 MMHG | SYSTOLIC BLOOD PRESSURE: 112 MMHG

## 2018-11-30 VITALS — SYSTOLIC BLOOD PRESSURE: 104 MMHG | DIASTOLIC BLOOD PRESSURE: 57 MMHG

## 2018-11-30 VITALS — DIASTOLIC BLOOD PRESSURE: 45 MMHG | SYSTOLIC BLOOD PRESSURE: 115 MMHG

## 2018-11-30 VITALS — SYSTOLIC BLOOD PRESSURE: 106 MMHG | DIASTOLIC BLOOD PRESSURE: 57 MMHG

## 2018-11-30 LAB
BUN SERPL-MCNC: 17 MG/DL (ref 7–18)
CALCIUM SERPL-MCNC: 7.9 MG/DL (ref 8.5–10.1)
CHLORIDE SERPL-SCNC: 108 MMOL/L (ref 98–107)
CO2 SERPL-SCNC: 32.9 MMOL/L (ref 21–32)
CREAT SERPL-MCNC: 0.7 MG/DL (ref 0.6–1)
ERYTHROCYTE [DISTWIDTH] IN BLOOD BY AUTOMATED COUNT: 19.8 % (ref 11.5–14.5)
GFR SERPLBLD BASED ON 1.73 SQ M-ARVRAT: > 60 ML/MIN
GLUCOSE SERPL-MCNC: 74 MG/DL (ref 74–106)
IRON SERPL-MCNC: 245 UG/DL (ref 50–170)
MAGNESIUM SERPL-MCNC: 2 MG/DL (ref 1.8–2.4)
MONOCYTES NFR BLD: 2 % (ref 0–7)
NEUTS BAND NFR BLD: 8 % (ref 0–10)
NEUTS SEG NFR BLD MANUAL: 70 % (ref 37–75)
PHOSPHATE SERPL-MCNC: 4.8 MG/DL (ref 2.5–4.9)
PLAT MORPH BLD: (no result)
PLATELET # BLD: 48 X10^3MCL (ref 130–400)
POTASSIUM SERPL-SCNC: 5.4 MMOL/L (ref 3.5–5.1)
RBC # BLD AUTO: 3.19 M/MM3 (ref 4.1–5.1)
RBC MORPH BLD: (no result)
SODIUM SERPL-SCNC: 145 MMOL/L (ref 136–145)
TIBC SERPL-MCNC: 296 UG/DL (ref 250–450)

## 2018-12-01 VITALS — DIASTOLIC BLOOD PRESSURE: 53 MMHG | SYSTOLIC BLOOD PRESSURE: 87 MMHG

## 2018-12-01 VITALS — SYSTOLIC BLOOD PRESSURE: 119 MMHG | DIASTOLIC BLOOD PRESSURE: 54 MMHG

## 2018-12-01 VITALS — SYSTOLIC BLOOD PRESSURE: 126 MMHG | DIASTOLIC BLOOD PRESSURE: 51 MMHG

## 2018-12-01 VITALS — DIASTOLIC BLOOD PRESSURE: 46 MMHG | SYSTOLIC BLOOD PRESSURE: 118 MMHG

## 2018-12-01 VITALS — DIASTOLIC BLOOD PRESSURE: 74 MMHG | SYSTOLIC BLOOD PRESSURE: 100 MMHG

## 2018-12-01 VITALS — SYSTOLIC BLOOD PRESSURE: 131 MMHG | DIASTOLIC BLOOD PRESSURE: 51 MMHG

## 2018-12-01 LAB
BASOPHILS NFR BLD: 0.2 % (ref 0–2)
BUN SERPL-MCNC: 21 MG/DL (ref 7–18)
CALCIUM SERPL-MCNC: 8 MG/DL (ref 8.5–10.1)
CHLORIDE SERPL-SCNC: 106 MMOL/L (ref 98–107)
CO2 SERPL-SCNC: 30.4 MMOL/L (ref 21–32)
CREAT SERPL-MCNC: 1.2 MG/DL (ref 0.6–1)
ERYTHROCYTE [DISTWIDTH] IN BLOOD BY AUTOMATED COUNT: 22.4 % (ref 11.5–14.5)
GFR SERPLBLD BASED ON 1.73 SQ M-ARVRAT: 59 ML/MIN
GLUCOSE SERPL-MCNC: 81 MG/DL (ref 74–106)
MAGNESIUM SERPL-MCNC: 2 MG/DL (ref 1.8–2.4)
OVALOCYTES BLD QL SMEAR: (no result)
PHOSPHATE SERPL-MCNC: 4.6 MG/DL (ref 2.5–4.9)
PLATELET # BLD: 41 X10^3MCL (ref 130–400)
POTASSIUM SERPL-SCNC: 5 MMOL/L (ref 3.5–5.1)
RBC MORPH BLD: (no result)
SODIUM SERPL-SCNC: 144 MMOL/L (ref 136–145)

## 2018-12-02 VITALS — SYSTOLIC BLOOD PRESSURE: 140 MMHG | DIASTOLIC BLOOD PRESSURE: 77 MMHG

## 2018-12-02 VITALS — SYSTOLIC BLOOD PRESSURE: 115 MMHG | DIASTOLIC BLOOD PRESSURE: 62 MMHG

## 2018-12-02 VITALS — DIASTOLIC BLOOD PRESSURE: 61 MMHG | SYSTOLIC BLOOD PRESSURE: 122 MMHG

## 2018-12-02 VITALS — DIASTOLIC BLOOD PRESSURE: 62 MMHG | SYSTOLIC BLOOD PRESSURE: 115 MMHG

## 2018-12-02 VITALS — DIASTOLIC BLOOD PRESSURE: 76 MMHG | SYSTOLIC BLOOD PRESSURE: 128 MMHG

## 2018-12-02 LAB
BASOPHILS NFR BLD: 0.1 % (ref 0–2)
BUN SERPL-MCNC: 25 MG/DL (ref 7–18)
CALCIUM SERPL-MCNC: 8.9 MG/DL (ref 8.5–10.1)
CHLORIDE SERPL-SCNC: 107 MMOL/L (ref 98–107)
CO2 SERPL-SCNC: 33 MMOL/L (ref 21–32)
CREAT SERPL-MCNC: 1.2 MG/DL (ref 0.6–1)
ERYTHROCYTE [DISTWIDTH] IN BLOOD BY AUTOMATED COUNT: 22.7 % (ref 11.5–14.5)
GFR SERPLBLD BASED ON 1.73 SQ M-ARVRAT: 59 ML/MIN
GLUCOSE SERPL-MCNC: 95 MG/DL (ref 74–106)
MAGNESIUM SERPL-MCNC: 2.2 MG/DL (ref 1.8–2.4)
PHOSPHATE SERPL-MCNC: 4.9 MG/DL (ref 2.5–4.9)
PLATELET # BLD: 42 X10^3MCL (ref 130–400)
POTASSIUM SERPL-SCNC: 4.9 MMOL/L (ref 3.5–5.1)
SODIUM SERPL-SCNC: 144 MMOL/L (ref 136–145)

## 2018-12-03 VITALS — SYSTOLIC BLOOD PRESSURE: 134 MMHG | DIASTOLIC BLOOD PRESSURE: 71 MMHG

## 2018-12-03 VITALS — SYSTOLIC BLOOD PRESSURE: 124 MMHG | DIASTOLIC BLOOD PRESSURE: 57 MMHG

## 2018-12-03 VITALS — SYSTOLIC BLOOD PRESSURE: 138 MMHG | DIASTOLIC BLOOD PRESSURE: 80 MMHG

## 2018-12-03 VITALS — SYSTOLIC BLOOD PRESSURE: 128 MMHG | DIASTOLIC BLOOD PRESSURE: 74 MMHG

## 2018-12-03 VITALS — DIASTOLIC BLOOD PRESSURE: 80 MMHG | SYSTOLIC BLOOD PRESSURE: 127 MMHG

## 2018-12-03 VITALS — SYSTOLIC BLOOD PRESSURE: 130 MMHG | DIASTOLIC BLOOD PRESSURE: 80 MMHG

## 2018-12-03 LAB
BASOPHILS NFR BLD: 0.6 % (ref 0–2)
BUN SERPL-MCNC: 28 MG/DL (ref 7–18)
CALCIUM SERPL-MCNC: 9.2 MG/DL (ref 8.5–10.1)
CHLORIDE SERPL-SCNC: 106 MMOL/L (ref 98–107)
CO2 SERPL-SCNC: 32.8 MMOL/L (ref 21–32)
CREAT SERPL-MCNC: 1.1 MG/DL (ref 0.6–1)
ERYTHROCYTE [DISTWIDTH] IN BLOOD BY AUTOMATED COUNT: 22.9 % (ref 11.5–14.5)
GFR SERPLBLD BASED ON 1.73 SQ M-ARVRAT: > 60 ML/MIN
GLUCOSE SERPL-MCNC: 99 MG/DL (ref 74–106)
MAGNESIUM SERPL-MCNC: 1.9 MG/DL (ref 1.8–2.4)
PHOSPHATE SERPL-MCNC: 4.5 MG/DL (ref 2.5–4.9)
PLATELET # BLD: 41 X10^3MCL (ref 130–400)
POTASSIUM SERPL-SCNC: 4.2 MMOL/L (ref 3.5–5.1)
RBC MORPH BLD: (no result)
SODIUM SERPL-SCNC: 143 MMOL/L (ref 136–145)

## 2018-12-04 VITALS — DIASTOLIC BLOOD PRESSURE: 72 MMHG | SYSTOLIC BLOOD PRESSURE: 131 MMHG

## 2018-12-04 VITALS — SYSTOLIC BLOOD PRESSURE: 155 MMHG | DIASTOLIC BLOOD PRESSURE: 73 MMHG

## 2018-12-04 VITALS — SYSTOLIC BLOOD PRESSURE: 137 MMHG | DIASTOLIC BLOOD PRESSURE: 67 MMHG

## 2018-12-04 VITALS — SYSTOLIC BLOOD PRESSURE: 127 MMHG | DIASTOLIC BLOOD PRESSURE: 75 MMHG

## 2018-12-04 VITALS — SYSTOLIC BLOOD PRESSURE: 133 MMHG | DIASTOLIC BLOOD PRESSURE: 70 MMHG

## 2018-12-04 LAB
BASOPHILS NFR BLD: 0.2 % (ref 0–2)
BUN SERPL-MCNC: 28 MG/DL (ref 7–18)
CALCIUM SERPL-MCNC: 9.1 MG/DL (ref 8.5–10.1)
CHLORIDE SERPL-SCNC: 105 MMOL/L (ref 98–107)
CO2 SERPL-SCNC: 33.7 MMOL/L (ref 21–32)
CREAT SERPL-MCNC: 1 MG/DL (ref 0.6–1)
ERYTHROCYTE [DISTWIDTH] IN BLOOD BY AUTOMATED COUNT: 21.9 % (ref 11.5–14.5)
GFR SERPLBLD BASED ON 1.73 SQ M-ARVRAT: > 60 ML/MIN
GLUCOSE SERPL-MCNC: 89 MG/DL (ref 74–106)
PLATELET # BLD: 50 X10^3MCL (ref 130–400)
POTASSIUM SERPL-SCNC: 4.1 MMOL/L (ref 3.5–5.1)
RBC MORPH BLD: (no result)
SODIUM SERPL-SCNC: 146 MMOL/L (ref 136–145)

## 2018-12-05 VITALS — SYSTOLIC BLOOD PRESSURE: 144 MMHG | DIASTOLIC BLOOD PRESSURE: 88 MMHG

## 2018-12-05 VITALS — SYSTOLIC BLOOD PRESSURE: 149 MMHG | DIASTOLIC BLOOD PRESSURE: 82 MMHG

## 2018-12-05 VITALS — DIASTOLIC BLOOD PRESSURE: 75 MMHG | SYSTOLIC BLOOD PRESSURE: 143 MMHG

## 2018-12-05 VITALS — SYSTOLIC BLOOD PRESSURE: 139 MMHG | DIASTOLIC BLOOD PRESSURE: 73 MMHG

## 2018-12-05 LAB
BASOPHILS NFR BLD: 0.2 % (ref 0–2)
BUN SERPL-MCNC: 34 MG/DL (ref 7–18)
CALCIUM SERPL-MCNC: 9.3 MG/DL (ref 8.5–10.1)
CHLORIDE SERPL-SCNC: 105 MMOL/L (ref 98–107)
CO2 SERPL-SCNC: 30.2 MMOL/L (ref 21–32)
CREAT SERPL-MCNC: 1 MG/DL (ref 0.6–1)
ERYTHROCYTE [DISTWIDTH] IN BLOOD BY AUTOMATED COUNT: 20.6 % (ref 11.5–14.5)
GFR SERPLBLD BASED ON 1.73 SQ M-ARVRAT: > 60 ML/MIN
GLUCOSE SERPL-MCNC: 103 MG/DL (ref 74–106)
PLATELET # BLD: 51 X10^3MCL (ref 130–400)
POTASSIUM SERPL-SCNC: 4.1 MMOL/L (ref 3.5–5.1)
RBC MORPH BLD: (no result)
SODIUM SERPL-SCNC: 145 MMOL/L (ref 136–145)

## 2018-12-06 VITALS — SYSTOLIC BLOOD PRESSURE: 156 MMHG | DIASTOLIC BLOOD PRESSURE: 93 MMHG

## 2018-12-06 VITALS — SYSTOLIC BLOOD PRESSURE: 140 MMHG | DIASTOLIC BLOOD PRESSURE: 81 MMHG

## 2018-12-06 VITALS — DIASTOLIC BLOOD PRESSURE: 76 MMHG | SYSTOLIC BLOOD PRESSURE: 122 MMHG

## 2018-12-06 VITALS — SYSTOLIC BLOOD PRESSURE: 141 MMHG | DIASTOLIC BLOOD PRESSURE: 83 MMHG

## 2018-12-06 VITALS — SYSTOLIC BLOOD PRESSURE: 145 MMHG | DIASTOLIC BLOOD PRESSURE: 84 MMHG

## 2018-12-06 LAB
BASOPHILS NFR BLD: 0.3 % (ref 0–2)
BUN SERPL-MCNC: 30 MG/DL (ref 7–18)
CALCIUM SERPL-MCNC: 8.6 MG/DL (ref 8.5–10.1)
CHLORIDE SERPL-SCNC: 106 MMOL/L (ref 98–107)
CO2 SERPL-SCNC: 35.9 MMOL/L (ref 21–32)
CREAT SERPL-MCNC: 0.8 MG/DL (ref 0.6–1)
ERYTHROCYTE [DISTWIDTH] IN BLOOD BY AUTOMATED COUNT: 22.1 % (ref 11.5–14.5)
GFR SERPLBLD BASED ON 1.73 SQ M-ARVRAT: > 60 ML/MIN
GLUCOSE SERPL-MCNC: 115 MG/DL (ref 74–106)
OVALOCYTES BLD QL SMEAR: (no result)
PLATELET # BLD: 57 X10^3MCL (ref 130–400)
POTASSIUM SERPL-SCNC: 4.1 MMOL/L (ref 3.5–5.1)
RBC MORPH BLD: (no result)
SODIUM SERPL-SCNC: 144 MMOL/L (ref 136–145)

## 2018-12-07 VITALS — DIASTOLIC BLOOD PRESSURE: 65 MMHG | SYSTOLIC BLOOD PRESSURE: 128 MMHG

## 2018-12-07 VITALS — SYSTOLIC BLOOD PRESSURE: 142 MMHG | DIASTOLIC BLOOD PRESSURE: 82 MMHG

## 2018-12-07 VITALS — SYSTOLIC BLOOD PRESSURE: 157 MMHG | DIASTOLIC BLOOD PRESSURE: 88 MMHG

## 2018-12-07 VITALS — DIASTOLIC BLOOD PRESSURE: 46 MMHG | SYSTOLIC BLOOD PRESSURE: 115 MMHG

## 2018-12-07 VITALS — DIASTOLIC BLOOD PRESSURE: 63 MMHG | SYSTOLIC BLOOD PRESSURE: 117 MMHG

## 2018-12-07 LAB
BASOPHILS NFR BLD: 0.4 % (ref 0–2)
BUN SERPL-MCNC: 27 MG/DL (ref 7–18)
CALCIUM SERPL-MCNC: 8.9 MG/DL (ref 8.5–10.1)
CHLORIDE SERPL-SCNC: 106 MMOL/L (ref 98–107)
CO2 SERPL-SCNC: 30.6 MMOL/L (ref 21–32)
CREAT SERPL-MCNC: 0.7 MG/DL (ref 0.6–1)
ERYTHROCYTE [DISTWIDTH] IN BLOOD BY AUTOMATED COUNT: 21.6 % (ref 11.5–14.5)
GFR SERPLBLD BASED ON 1.73 SQ M-ARVRAT: > 60 ML/MIN
GLUCOSE SERPL-MCNC: 99 MG/DL (ref 74–106)
MAGNESIUM SERPL-MCNC: 1.9 MG/DL (ref 1.8–2.4)
PHOSPHATE SERPL-MCNC: 4.1 MG/DL (ref 2.5–4.9)
PLATELET # BLD: 33 X10^3MCL (ref 130–400)
POTASSIUM SERPL-SCNC: 5 MMOL/L (ref 3.5–5.1)
RBC MORPH BLD: (no result)
SODIUM SERPL-SCNC: 143 MMOL/L (ref 136–145)

## 2018-12-08 VITALS — DIASTOLIC BLOOD PRESSURE: 69 MMHG | SYSTOLIC BLOOD PRESSURE: 142 MMHG

## 2018-12-08 VITALS — DIASTOLIC BLOOD PRESSURE: 78 MMHG | SYSTOLIC BLOOD PRESSURE: 132 MMHG

## 2018-12-08 VITALS — SYSTOLIC BLOOD PRESSURE: 133 MMHG | DIASTOLIC BLOOD PRESSURE: 77 MMHG

## 2018-12-08 VITALS — SYSTOLIC BLOOD PRESSURE: 149 MMHG | DIASTOLIC BLOOD PRESSURE: 86 MMHG

## 2018-12-09 VITALS — DIASTOLIC BLOOD PRESSURE: 96 MMHG | SYSTOLIC BLOOD PRESSURE: 148 MMHG

## 2018-12-09 VITALS — SYSTOLIC BLOOD PRESSURE: 125 MMHG | DIASTOLIC BLOOD PRESSURE: 52 MMHG

## 2018-12-09 VITALS — SYSTOLIC BLOOD PRESSURE: 141 MMHG | DIASTOLIC BLOOD PRESSURE: 84 MMHG

## 2018-12-09 VITALS — DIASTOLIC BLOOD PRESSURE: 84 MMHG | SYSTOLIC BLOOD PRESSURE: 141 MMHG

## 2018-12-09 VITALS — DIASTOLIC BLOOD PRESSURE: 80 MMHG | SYSTOLIC BLOOD PRESSURE: 132 MMHG

## 2018-12-09 VITALS — DIASTOLIC BLOOD PRESSURE: 82 MMHG | SYSTOLIC BLOOD PRESSURE: 128 MMHG

## 2018-12-10 VITALS — SYSTOLIC BLOOD PRESSURE: 148 MMHG | DIASTOLIC BLOOD PRESSURE: 92 MMHG

## 2018-12-10 VITALS — DIASTOLIC BLOOD PRESSURE: 81 MMHG | SYSTOLIC BLOOD PRESSURE: 143 MMHG

## 2018-12-10 VITALS — SYSTOLIC BLOOD PRESSURE: 150 MMHG | DIASTOLIC BLOOD PRESSURE: 78 MMHG

## 2018-12-10 VITALS — DIASTOLIC BLOOD PRESSURE: 80 MMHG | SYSTOLIC BLOOD PRESSURE: 131 MMHG

## 2018-12-10 VITALS — SYSTOLIC BLOOD PRESSURE: 146 MMHG | DIASTOLIC BLOOD PRESSURE: 86 MMHG

## 2018-12-11 VITALS — SYSTOLIC BLOOD PRESSURE: 137 MMHG | DIASTOLIC BLOOD PRESSURE: 77 MMHG

## 2018-12-11 VITALS — DIASTOLIC BLOOD PRESSURE: 49 MMHG | SYSTOLIC BLOOD PRESSURE: 101 MMHG

## 2018-12-11 VITALS — DIASTOLIC BLOOD PRESSURE: 50 MMHG | SYSTOLIC BLOOD PRESSURE: 122 MMHG

## 2018-12-11 VITALS — DIASTOLIC BLOOD PRESSURE: 67 MMHG | SYSTOLIC BLOOD PRESSURE: 112 MMHG

## 2018-12-11 VITALS — DIASTOLIC BLOOD PRESSURE: 85 MMHG | SYSTOLIC BLOOD PRESSURE: 144 MMHG

## 2018-12-11 VITALS — SYSTOLIC BLOOD PRESSURE: 122 MMHG | DIASTOLIC BLOOD PRESSURE: 45 MMHG

## 2018-12-12 VITALS — SYSTOLIC BLOOD PRESSURE: 110 MMHG | DIASTOLIC BLOOD PRESSURE: 74 MMHG

## 2018-12-12 VITALS — DIASTOLIC BLOOD PRESSURE: 74 MMHG | SYSTOLIC BLOOD PRESSURE: 110 MMHG

## 2018-12-12 VITALS — DIASTOLIC BLOOD PRESSURE: 85 MMHG | SYSTOLIC BLOOD PRESSURE: 140 MMHG

## 2018-12-12 VITALS — SYSTOLIC BLOOD PRESSURE: 108 MMHG | DIASTOLIC BLOOD PRESSURE: 52 MMHG
